# Patient Record
Sex: FEMALE | Race: WHITE | NOT HISPANIC OR LATINO | Employment: OTHER | URBAN - METROPOLITAN AREA
[De-identification: names, ages, dates, MRNs, and addresses within clinical notes are randomized per-mention and may not be internally consistent; named-entity substitution may affect disease eponyms.]

---

## 2018-11-28 ENCOUNTER — OFFICE VISIT (OUTPATIENT)
Dept: FAMILY MEDICINE CLINIC | Facility: CLINIC | Age: 63
End: 2018-11-28
Payer: COMMERCIAL

## 2018-11-28 VITALS
HEART RATE: 74 BPM | HEIGHT: 69 IN | BODY MASS INDEX: 25.62 KG/M2 | RESPIRATION RATE: 16 BRPM | DIASTOLIC BLOOD PRESSURE: 78 MMHG | SYSTOLIC BLOOD PRESSURE: 110 MMHG | OXYGEN SATURATION: 98 % | WEIGHT: 173 LBS

## 2018-11-28 DIAGNOSIS — Z23 NEED FOR TDAP VACCINATION: Primary | ICD-10-CM

## 2018-11-28 DIAGNOSIS — Z13.6 SCREENING FOR CARDIOVASCULAR CONDITION: ICD-10-CM

## 2018-11-28 DIAGNOSIS — Z12.31 ENCOUNTER FOR SCREENING MAMMOGRAM FOR BREAST CANCER: ICD-10-CM

## 2018-11-28 DIAGNOSIS — F41.9 ANXIETY: ICD-10-CM

## 2018-11-28 PROCEDURE — 90715 TDAP VACCINE 7 YRS/> IM: CPT | Performed by: FAMILY MEDICINE

## 2018-11-28 PROCEDURE — 99204 OFFICE O/P NEW MOD 45 MIN: CPT | Performed by: FAMILY MEDICINE

## 2018-11-28 PROCEDURE — 1036F TOBACCO NON-USER: CPT | Performed by: FAMILY MEDICINE

## 2018-11-28 PROCEDURE — 90471 IMMUNIZATION ADMIN: CPT | Performed by: FAMILY MEDICINE

## 2018-11-28 RX ORDER — ALPRAZOLAM 0.25 MG/1
0.25 TABLET ORAL
Qty: 30 TABLET | Refills: 0 | Status: SHIPPED | OUTPATIENT
Start: 2018-11-28 | End: 2019-01-16 | Stop reason: SDUPTHER

## 2018-11-28 RX ORDER — PAROXETINE HYDROCHLORIDE 12.5 MG/1
12.5 TABLET, FILM COATED, EXTENDED RELEASE ORAL EVERY MORNING
Qty: 30 TABLET | Refills: 0 | Status: SHIPPED | OUTPATIENT
Start: 2018-11-28 | End: 2018-12-05 | Stop reason: SDUPTHER

## 2018-11-28 NOTE — ASSESSMENT & PLAN NOTE
checked, she had last prescription 1 year ago for 30 pills of alprazolam,  I gave her a new prescription for alprazolam to be used as needed and she will start on Paxil and advised to come back follow-up in a week, she has been informed about any side effects like suicidal homicidal thoughts she should not continue medication then and she should call the doctor

## 2018-11-28 NOTE — PROGRESS NOTES
Assessment/Plan:    Problem List Items Addressed This Visit        Other    Need for Tdap vaccination - Primary    Relevant Orders    TDAP VACCINE GREATER THAN OR EQUAL TO 8YO IM (Completed)    Anxiety      checked, she had last prescription 1 year ago for 30 pills of alprazolam,  I gave her a new prescription for alprazolam to be used as needed and she will start on Paxil and advised to come back follow-up in a week, she has been informed about any side effects like suicidal homicidal thoughts she should not continue medication then and she should call the doctor         Relevant Medications    PARoxetine (PAXIL-CR) 12 5 mg 24 hr tablet    ALPRAZolam (XANAX) 0 25 mg tablet    Screening for cardiovascular condition    Relevant Orders    CBC and differential    Comprehensive metabolic panel    Lipid panel    TSH, 3rd generation          Chief Complaint   Patient presents with   [facility]    Anxiety       Subjective:   Patient ID: Antonio Barkley is a 66 y o  female  She is a new patient, she says that she has long history of anxiety and panic, she says even when she was in school she used to feel nervousness and anxiousness, she says few years ago when her  passed away she also felt depressed but she says mostly anxiety is the main problem, she also has some problem in sleeping sometimes she takes over-the-counter melatonin,  She says she was given alprazolam a year ago by another physician and she still has 2 tablets left she takes occasionally when she really feels very high anxiety  She does not smoke she drinks alcohol few times per week    She has previous history of hysterectomy and oophorectomy, she is due for her Tdap and mammogram and she has no labs done recently  She says when she had panic attack she was checked completely and she was told she has mitral valve prolapse but she is asymptomatic          Review of Systems   Constitutional: Negative for activity change, appetite change, chills, diaphoresis, fatigue, fever and unexpected weight change  HENT: Negative for congestion, dental problem, ear discharge, ear pain, facial swelling, hearing loss, mouth sores, nosebleeds, postnasal drip, rhinorrhea, sinus pain, sinus pressure, sneezing, sore throat, trouble swallowing and voice change  Eyes: Negative for photophobia, pain, discharge, redness and itching  Respiratory: Negative for cough, chest tightness, shortness of breath and wheezing  Cardiovascular: Negative for chest pain, palpitations and leg swelling  Gastrointestinal: Negative for abdominal distention, abdominal pain, blood in stool, constipation, diarrhea and nausea  Endocrine: Negative for cold intolerance, heat intolerance, polydipsia, polyphagia and polyuria  Genitourinary: Negative for dysuria, flank pain, frequency, hematuria and urgency  Musculoskeletal: Negative for arthralgias, back pain, myalgias and neck pain  Skin: Negative for color change and pallor  Allergic/Immunologic: Negative for environmental allergies and food allergies  Neurological: Negative for dizziness, weakness, light-headedness, numbness and headaches  Hematological: Negative for adenopathy  Does not bruise/bleed easily  Psychiatric/Behavioral: Negative for behavioral problems, sleep disturbance and suicidal ideas  The patient is not nervous/anxious  Objective:  Physical Exam   Constitutional: She is oriented to person, place, and time  She appears well-developed and well-nourished  HENT:   Head: Normocephalic and atraumatic  Right Ear: External ear normal    Left Ear: External ear normal    Nose: Nose normal    Mouth/Throat: Oropharynx is clear and moist  No oropharyngeal exudate  Eyes: Pupils are equal, round, and reactive to light  Conjunctivae and EOM are normal  Right eye exhibits no discharge  Left eye exhibits no discharge  No scleral icterus  Neck: Normal range of motion  Neck supple   No tracheal deviation present  No thyromegaly present  Cardiovascular: Normal rate, regular rhythm and normal heart sounds  No murmur heard  Pulmonary/Chest: Effort normal and breath sounds normal  No respiratory distress  She has no wheezes  She has no rales  Abdominal: Soft  Bowel sounds are normal  She exhibits no distension and no mass  There is no tenderness  There is no rebound  Musculoskeletal: Normal range of motion  She exhibits no edema  Lymphadenopathy:     She has no cervical adenopathy  Neurological: She is alert and oriented to person, place, and time  She has normal reflexes  No cranial nerve deficit  Skin: Skin is warm  No rash noted  No erythema  No pallor  Psychiatric: She has a normal mood and affect  Her behavior is normal  Judgment and thought content normal    Nursing note and vitals reviewed            Past Surgical History:   Procedure Laterality Date    CHOLECYSTECTOMY      HYSTERECTOMY      SINUS SURGERY         Family History   Problem Relation Age of Onset    Atrial fibrillation Mother     Hypertension Mother     No Known Problems Father          Current Outpatient Prescriptions:     ALPRAZolam (XANAX) 0 25 mg tablet, Take 1 tablet (0 25 mg total) by mouth daily at bedtime as needed for anxiety for up to 30 days, Disp: 30 tablet, Rfl: 0    PARoxetine (PAXIL-CR) 12 5 mg 24 hr tablet, Take 1 tablet (12 5 mg total) by mouth every morning, Disp: 30 tablet, Rfl: 0    No Known Allergies    Vitals:    11/28/18 1013   BP: 110/78   Pulse: 74   Resp: 16   SpO2: 98%   Weight: 78 5 kg (173 lb)   Height: 5' 9" (1 753 m)

## 2018-12-05 ENCOUNTER — OFFICE VISIT (OUTPATIENT)
Dept: FAMILY MEDICINE CLINIC | Facility: CLINIC | Age: 63
End: 2018-12-05
Payer: COMMERCIAL

## 2018-12-05 VITALS
BODY MASS INDEX: 25.62 KG/M2 | HEART RATE: 72 BPM | HEIGHT: 69 IN | DIASTOLIC BLOOD PRESSURE: 68 MMHG | WEIGHT: 173 LBS | RESPIRATION RATE: 16 BRPM | OXYGEN SATURATION: 98 % | SYSTOLIC BLOOD PRESSURE: 104 MMHG

## 2018-12-05 DIAGNOSIS — F41.9 ANXIETY: ICD-10-CM

## 2018-12-05 PROCEDURE — 99213 OFFICE O/P EST LOW 20 MIN: CPT | Performed by: FAMILY MEDICINE

## 2018-12-05 PROCEDURE — 3008F BODY MASS INDEX DOCD: CPT | Performed by: FAMILY MEDICINE

## 2018-12-05 RX ORDER — PAROXETINE HYDROCHLORIDE 12.5 MG/1
12.5 TABLET, FILM COATED, EXTENDED RELEASE ORAL EVERY MORNING
Qty: 30 TABLET | Refills: 0 | Status: SHIPPED | OUTPATIENT
Start: 2018-12-05 | End: 2019-01-16 | Stop reason: ALTCHOICE

## 2018-12-05 NOTE — ASSESSMENT & PLAN NOTE
Anxiety somewhat improved with Paxil 12 5 mg, will continue the same medication and will follow up in 6 weeks, she has no side effects with medication she still has some sleep issues she has not use any alprazolam yet    Discussed about sleep hygiene and melatonin as needed

## 2018-12-05 NOTE — PROGRESS NOTES
Assessment/Plan:    Problem List Items Addressed This Visit        Other    Anxiety     Anxiety somewhat improved with Paxil 12 5 mg, will continue the same medication and will follow up in 6 weeks, she has no side effects with medication she still has some sleep issues she has not use any alprazolam yet  Discussed about sleep hygiene and melatonin as needed         Relevant Medications    PARoxetine (PAXIL-CR) 12 5 mg 24 hr tablet          Chief Complaint   Patient presents with    Follow-up       Subjective:   Patient ID: Maile Montalvo is a 66 y o  female  she was started on Paxil last week and she feels like it is helping to improve her anxiousness, she still has problem in sleeping she says some days she sleeps 7 hr ,another days she sometimes cannot sleep and she takes melatonin at bedtime some days  She has a previous long history of anxiety and in the past she has taken many medications  She has no suicidal homicidal thoughts, she does not feel depress her appetite is normal and she avoids caffeinated drinks in the evening time  Review of Systems   Constitutional: Negative for activity change, appetite change, chills, diaphoresis, fatigue, fever and unexpected weight change  HENT: Negative for congestion, dental problem, ear discharge, ear pain, facial swelling, hearing loss, mouth sores, nosebleeds, postnasal drip, rhinorrhea, sinus pain, sinus pressure, sneezing, sore throat, trouble swallowing and voice change  Eyes: Negative for photophobia, pain, discharge, redness and itching  Respiratory: Negative for cough, chest tightness, shortness of breath and wheezing  Cardiovascular: Negative for chest pain, palpitations and leg swelling  Gastrointestinal: Negative for abdominal distention, abdominal pain, blood in stool, constipation, diarrhea and nausea  Endocrine: Negative for cold intolerance, heat intolerance, polydipsia, polyphagia and polyuria     Genitourinary: Negative for dysuria, flank pain, frequency, hematuria and urgency  Musculoskeletal: Negative for arthralgias, back pain, myalgias and neck pain  Skin: Negative for color change and pallor  Allergic/Immunologic: Negative for environmental allergies and food allergies  Neurological: Negative for dizziness, weakness, light-headedness, numbness and headaches  Hematological: Negative for adenopathy  Does not bruise/bleed easily  Psychiatric/Behavioral: Positive for sleep disturbance  Negative for behavioral problems and suicidal ideas  The patient is not nervous/anxious  Objective:  Physical Exam   Constitutional: She appears well-developed  HENT:   Head: Normocephalic and atraumatic  Eyes: EOM are normal    Neck: Neck supple  Cardiovascular: Normal rate and regular rhythm  Pulmonary/Chest: Effort normal and breath sounds normal  No respiratory distress  Nursing note and vitals reviewed           Past Surgical History:   Procedure Laterality Date    CHOLECYSTECTOMY      HYSTERECTOMY      SINUS SURGERY         Family History   Problem Relation Age of Onset    Atrial fibrillation Mother     Hypertension Mother     No Known Problems Father     Diabetes Brother          Current Outpatient Prescriptions:     ALPRAZolam (XANAX) 0 25 mg tablet, Take 1 tablet (0 25 mg total) by mouth daily at bedtime as needed for anxiety for up to 30 days, Disp: 30 tablet, Rfl: 0    PARoxetine (PAXIL-CR) 12 5 mg 24 hr tablet, Take 1 tablet (12 5 mg total) by mouth every morning, Disp: 30 tablet, Rfl: 0    No Known Allergies    Vitals:    12/05/18 0801   BP: 104/68   Pulse: 72   Resp: 16   SpO2: 98%   Weight: 78 5 kg (173 lb)   Height: 5' 9" (1 753 m)

## 2018-12-26 DIAGNOSIS — F41.9 ANXIETY: ICD-10-CM

## 2018-12-27 RX ORDER — PAROXETINE HYDROCHLORIDE 12.5 MG/1
12.5 TABLET, FILM COATED, EXTENDED RELEASE ORAL EVERY MORNING
Qty: 30 TABLET | Refills: 0 | Status: SHIPPED | OUTPATIENT
Start: 2018-12-27 | End: 2019-01-16

## 2018-12-27 NOTE — TELEPHONE ENCOUNTER
Patient last seen 12/5 and instructed to return in 6 weeks    Patient has follow up scheduled for 1/16/19

## 2019-01-16 ENCOUNTER — OFFICE VISIT (OUTPATIENT)
Dept: FAMILY MEDICINE CLINIC | Facility: CLINIC | Age: 64
End: 2019-01-16
Payer: COMMERCIAL

## 2019-01-16 VITALS
SYSTOLIC BLOOD PRESSURE: 102 MMHG | HEART RATE: 90 BPM | DIASTOLIC BLOOD PRESSURE: 70 MMHG | BODY MASS INDEX: 26.07 KG/M2 | OXYGEN SATURATION: 99 % | WEIGHT: 176 LBS | HEIGHT: 69 IN | RESPIRATION RATE: 16 BRPM

## 2019-01-16 DIAGNOSIS — F41.9 ANXIETY: ICD-10-CM

## 2019-01-16 PROCEDURE — 99213 OFFICE O/P EST LOW 20 MIN: CPT | Performed by: FAMILY MEDICINE

## 2019-01-16 RX ORDER — ESCITALOPRAM OXALATE 10 MG/1
10 TABLET ORAL DAILY
Qty: 30 TABLET | Refills: 0 | Status: SHIPPED | OUTPATIENT
Start: 2019-01-16 | End: 2019-01-30 | Stop reason: SDUPTHER

## 2019-01-16 RX ORDER — ALPRAZOLAM 0.25 MG/1
0.25 TABLET ORAL 2 TIMES DAILY PRN
Qty: 30 TABLET | Refills: 0 | Status: SHIPPED | OUTPATIENT
Start: 2019-01-16 | End: 2019-02-15

## 2019-01-16 NOTE — ASSESSMENT & PLAN NOTE
She says initially medicine was working Paxil 12 5 mg but gradually she started feeling that she is getting more anxious and now this week and she had almost panic attacks and she was crying with heart racing and feeling very jittery  , she has been taking Xanax as to relax sometimes as needed and she took 1 pill this morning, advised to stop the Paxil and after 2 days she will start Lexapro and in the meantime continue alprazolam as needed 1-2 tablets, and then follow up about 2 weeks  [facility] site checked about xanax script   I discussed with her that as she stop alcohol in last few days maybe that is increasing her panicking and anxiety, she should not restart alcohol she can only take Xanax as needed and I will start her on Lexapro

## 2019-01-16 NOTE — PROGRESS NOTES
Assessment/Plan:    Problem List Items Addressed This Visit        Other    Anxiety     She says initially medicine was working Paxil 12 5 mg but gradually she started feeling that she is getting more anxious and now this week and she had almost panic attacks and she was crying with heart racing and feeling very jittery  , she has been taking Xanax as to relax sometimes as needed and she took 1 pill this morning, advised to stop the Paxil and after 2 days she will start Lexapro and in the meantime continue alprazolam as needed 1-2 tablets, and then follow up about 2 weeks  [facility] site checked about xanax script   I discussed with her that as she stop alcohol in last few days maybe that is increasing her panicking and anxiety, she should not restart alcohol she can only take Xanax as needed and I will start her on Lexapro         Relevant Medications    ALPRAZolam (XANAX) 0 25 mg tablet    escitalopram (LEXAPRO) 10 mg tablet          Chief Complaint   Patient presents with    Follow-up       Subjective:   Patient ID: Jesika Quintana is a 66 y o  female  She is here for follow-up on anxiety and panic, she was started on Paxil 12 5 mg 6 weeks ago initially it start helping her and she was more come, but now she says gradually she is getting more anxious and on this Weekend since last 3 days she has been very panicking and she has been using Xanax 1-2 tablets and she says there is no reason why she start getting panic  She is not smoker but she admits that she was drinking alcohol 3-4 glasses every day and and now for last few days she has stopped it completely as she does not want to do it  She admits that she never have any alcohol problems  She denies any other drug problem  She works , and today she took off  Review of Systems   Constitutional: Negative for activity change, appetite change, chills, diaphoresis, fatigue, fever and unexpected weight change     HENT: Negative for congestion, dental problem, ear discharge, ear pain, facial swelling, hearing loss, mouth sores, nosebleeds, postnasal drip, rhinorrhea, sinus pain, sinus pressure, sneezing, sore throat, trouble swallowing and voice change  Eyes: Negative for photophobia, pain, discharge, redness and itching  Respiratory: Negative for cough, chest tightness, shortness of breath and wheezing  Cardiovascular: Negative for chest pain, palpitations and leg swelling  Gastrointestinal: Negative for abdominal distention, abdominal pain, blood in stool, constipation, diarrhea and nausea  Endocrine: Negative for cold intolerance, heat intolerance, polydipsia, polyphagia and polyuria  Genitourinary: Negative for dysuria, flank pain, frequency, hematuria and urgency  Musculoskeletal: Negative for arthralgias, back pain, myalgias and neck pain  Skin: Negative for color change and pallor  Allergic/Immunologic: Negative for environmental allergies and food allergies  Neurological: Negative for dizziness, weakness, light-headedness, numbness and headaches  Hematological: Negative for adenopathy  Does not bruise/bleed easily  Psychiatric/Behavioral: Negative for behavioral problems, sleep disturbance and suicidal ideas  The patient is nervous/anxious  Objective:  Physical Exam   Constitutional: She appears well-developed and well-nourished  HENT:   Head: Normocephalic and atraumatic  Eyes: Pupils are equal, round, and reactive to light  Conjunctivae and EOM are normal  No scleral icterus  Neck: Normal range of motion  Neck supple  No thyromegaly present  Cardiovascular: Normal rate, regular rhythm and normal heart sounds  Pulmonary/Chest: Effort normal and breath sounds normal  She has no wheezes  She has no rales  Lymphadenopathy:     She has no cervical adenopathy  Neurological: She is alert  Skin: No rash noted  No erythema  Psychiatric:   Looking very anxious and tearful   Nursing note and vitals reviewed           Past Surgical History:   Procedure Laterality Date    CHOLECYSTECTOMY      HYSTERECTOMY      SINUS SURGERY         Family History   Problem Relation Age of Onset    Atrial fibrillation Mother     Hypertension Mother     No Known Problems Father     Diabetes Brother          Current Outpatient Prescriptions:     ALPRAZolam (XANAX) 0 25 mg tablet, Take 1 tablet (0 25 mg total) by mouth 2 (two) times a day as needed for anxiety for up to 30 days, Disp: 30 tablet, Rfl: 0    escitalopram (LEXAPRO) 10 mg tablet, Take 1 tablet (10 mg total) by mouth daily, Disp: 30 tablet, Rfl: 0    No Known Allergies    Vitals:    01/16/19 0757 01/16/19 0826   BP: 102/70    Pulse: (!) 112 90   Resp: 16    SpO2: 99%    Weight: 79 8 kg (176 lb)    Height: 5' 9" (1 753 m)

## 2019-01-30 ENCOUNTER — OFFICE VISIT (OUTPATIENT)
Dept: FAMILY MEDICINE CLINIC | Facility: CLINIC | Age: 64
End: 2019-01-30
Payer: COMMERCIAL

## 2019-01-30 VITALS
WEIGHT: 177 LBS | OXYGEN SATURATION: 98 % | DIASTOLIC BLOOD PRESSURE: 78 MMHG | BODY MASS INDEX: 26.22 KG/M2 | HEIGHT: 69 IN | SYSTOLIC BLOOD PRESSURE: 112 MMHG | HEART RATE: 98 BPM | RESPIRATION RATE: 16 BRPM

## 2019-01-30 DIAGNOSIS — F41.9 ANXIETY: ICD-10-CM

## 2019-01-30 PROCEDURE — 1036F TOBACCO NON-USER: CPT | Performed by: FAMILY MEDICINE

## 2019-01-30 PROCEDURE — 3008F BODY MASS INDEX DOCD: CPT | Performed by: FAMILY MEDICINE

## 2019-01-30 PROCEDURE — 99213 OFFICE O/P EST LOW 20 MIN: CPT | Performed by: FAMILY MEDICINE

## 2019-01-30 RX ORDER — ESCITALOPRAM OXALATE 10 MG/1
10 TABLET ORAL DAILY
Qty: 30 TABLET | Refills: 0 | Status: SHIPPED | OUTPATIENT
Start: 2019-01-30 | End: 2022-05-05

## 2019-01-30 NOTE — PROGRESS NOTES
Assessment/Plan:    Problem List Items Addressed This Visit        Other    Anxiety     Anxiety improved with the Lexapro, Paxil was stopped because that increase her anxiety  She did not use Xanax in last 2 weeks  Continue Lexapro and discussed about healthy lifestyle cutting down her alcohol for the and no caffeinated drinks  And she should plan doing regular exercise  Follow-up in 3 weeks         Relevant Medications    escitalopram (LEXAPRO) 10 mg tablet          Chief Complaint   Patient presents with    Follow-up    Anxiety       Subjective:   Patient ID: Maile Montalvo is a 66 y o  female  She is here for follow-up on anxiety, she was started on Lexapro about 2 weeks ago after stopping Paxil, she says as soon as she stops Paxil she felt much better and she thinks her anxiety due got worse with the Paxil,  She says for last 2 weeks he has not even use 1 time Xanax , she picked up her Xanax but she does not think she needs it,  She has a family history of anxiety,  She also admitted that she has cut down on her alcohol drinking and in last 2 weeks she only date 3 times  She tries to avoid caffeinated drinks  She says she has mild headache for last few days but she thinks her sinuses probably giving her some headache but she has no nasal discharge no sore throat fever or chills        Review of Systems   Constitutional: Negative for activity change, appetite change, chills, diaphoresis, fatigue, fever and unexpected weight change  HENT: Negative for congestion, dental problem, ear discharge, ear pain, facial swelling, hearing loss, mouth sores, nosebleeds, postnasal drip, rhinorrhea, sinus pain, sinus pressure, sneezing, sore throat, trouble swallowing and voice change  Eyes: Negative for photophobia, pain, discharge, redness and itching  Respiratory: Negative for cough, chest tightness, shortness of breath and wheezing  Cardiovascular: Negative for chest pain, palpitations and leg swelling  Gastrointestinal: Negative for abdominal distention, abdominal pain, blood in stool, constipation, diarrhea and nausea  Endocrine: Negative for cold intolerance, heat intolerance, polydipsia, polyphagia and polyuria  Genitourinary: Negative for dysuria, flank pain, frequency, hematuria and urgency  Musculoskeletal: Negative for arthralgias, back pain, myalgias and neck pain  Skin: Negative for color change and pallor  Allergic/Immunologic: Negative for environmental allergies and food allergies  Neurological: Negative for dizziness, weakness, light-headedness, numbness and headaches  Hematological: Negative for adenopathy  Does not bruise/bleed easily  Psychiatric/Behavioral: Negative for behavioral problems, sleep disturbance and suicidal ideas  The patient is not nervous/anxious  Objective:  Physical Exam   Constitutional: She appears well-developed and well-nourished  HENT:   Head: Normocephalic and atraumatic  Right Ear: External ear normal    Left Ear: External ear normal    Mouth/Throat: Oropharynx is clear and moist    Eyes: Pupils are equal, round, and reactive to light  Conjunctivae and EOM are normal  No scleral icterus  Neck: Normal range of motion  Neck supple  No thyromegaly present  Cardiovascular: Normal rate, regular rhythm and normal heart sounds  Pulmonary/Chest: Effort normal and breath sounds normal  She has no wheezes  She has no rales  Lymphadenopathy:     She has no cervical adenopathy  Neurological: She is alert  Skin: No rash noted  No erythema  Psychiatric: She has a normal mood and affect  Her behavior is normal  Judgment and thought content normal    Nursing note and vitals reviewed           Past Surgical History:   Procedure Laterality Date    CHOLECYSTECTOMY      HYSTERECTOMY      SINUS SURGERY         Family History   Problem Relation Age of Onset    Atrial fibrillation Mother     Hypertension Mother     No Known Problems Father    Declan Diabetes Brother          Current Outpatient Prescriptions:     ALPRAZolam (XANAX) 0 25 mg tablet, Take 1 tablet (0 25 mg total) by mouth 2 (two) times a day as needed for anxiety for up to 30 days, Disp: 30 tablet, Rfl: 0    escitalopram (LEXAPRO) 10 mg tablet, Take 1 tablet (10 mg total) by mouth daily, Disp: 30 tablet, Rfl: 0    No Known Allergies    Vitals:    01/30/19 0815 01/30/19 0826   BP: 112/78    Pulse: 100 98   Resp: 16    SpO2: 98%    Weight: 80 3 kg (177 lb)    Height: 5' 9" (1 753 m)

## 2019-01-30 NOTE — ASSESSMENT & PLAN NOTE
Anxiety improved with the Lexapro, Paxil was stopped because that increase her anxiety  She did not use Xanax in last 2 weeks  Continue Lexapro and discussed about healthy lifestyle cutting down her alcohol for the and no caffeinated drinks  And she should plan doing regular exercise    Follow-up in 3 weeks

## 2019-08-08 DIAGNOSIS — Z12.31 ENCOUNTER FOR SCREENING MAMMOGRAM FOR BREAST CANCER: Primary | ICD-10-CM

## 2020-02-14 ENCOUNTER — OFFICE VISIT (OUTPATIENT)
Dept: GASTROENTEROLOGY | Facility: AMBULARY SURGERY CENTER | Age: 65
End: 2020-02-14
Payer: COMMERCIAL

## 2020-02-14 VITALS
TEMPERATURE: 98.1 F | WEIGHT: 164 LBS | BODY MASS INDEX: 24.29 KG/M2 | DIASTOLIC BLOOD PRESSURE: 80 MMHG | SYSTOLIC BLOOD PRESSURE: 115 MMHG | HEART RATE: 80 BPM | HEIGHT: 69 IN

## 2020-02-14 DIAGNOSIS — R10.31 CHRONIC RLQ PAIN: ICD-10-CM

## 2020-02-14 DIAGNOSIS — R19.8 CHANGE IN BOWEL MOVEMENT: Primary | ICD-10-CM

## 2020-02-14 DIAGNOSIS — G89.29 CHRONIC RLQ PAIN: ICD-10-CM

## 2020-02-14 PROCEDURE — 3008F BODY MASS INDEX DOCD: CPT | Performed by: INTERNAL MEDICINE

## 2020-02-14 PROCEDURE — 99244 OFF/OP CNSLTJ NEW/EST MOD 40: CPT | Performed by: INTERNAL MEDICINE

## 2020-02-14 RX ORDER — VILAZODONE HYDROCHLORIDE 10 MG/1
TABLET ORAL
COMMUNITY
Start: 2020-01-07 | End: 2022-05-05

## 2020-02-14 RX ORDER — ALPRAZOLAM 0.25 MG/1
TABLET ORAL
COMMUNITY
Start: 2020-02-11 | End: 2022-06-20 | Stop reason: SDUPTHER

## 2020-02-14 NOTE — LETTER
February 14, 2020     Kingston Alfaro MD  [address]  [place] [id number]    Patient: Nidia Mckay   YOB: 1955   Date of Visit: 2/14/2020       Dear Dr Collette Mark:    Thank you for referring [place] to me for evaluation  Below are my notes for this consultation  If you have questions, please do not hesitate to call me  I look forward to following your patient along with you  Sincerely,        Marvin Rivero MD        CC: No Recipients  Marvin Rivero MD  2/14/2020  8:34 AM  Sign at close encounter  Consultation - [facility]  Nidia Mckay 60 y o  female MRN: [id number]          Assessment & Plan:    Pleasant 68-year-old female with a history of depression, anxiety, longstanding history of irregular stools, more recently associated with right lower quadrant discomfort and worsening irregularity of bowel movements with increasing loose stools  1  Change in bowel movements with right lower quadrant discomfort:  Differential is most likely secondary to irritable bowel syndrome, inflammatory, malignancies are all on the differential  -we will check celiac panel  -we will check a general abdominal ultrasound  -recommended trial of lactose-free diet and fiber supplementation on a regular basis  -we will proceed with colonoscopy to evaluate for the above luminal process is  -discussed with her the risks of the procedure including bleeding, surgery, perforation  -we will try to obtain her recent laboratory studies from outside lab as well to review  -if indicated we may add an upper endoscopy to her colonoscopy            _____________________________________________________________        CC:  Intermittent right lower quadrant discomfort and change in bowel movements    HPI:  Nidia Mckay is a 60 y  o female who was referred for evaluation of right lower quadrant discomfort and change in bowel movements    As you know this is a pleasant 68-year-old female with a history depression and anxiety, she had a normal colonoscopy in 2014, no history of GI or associated malignancies or inflammatory bowel disease reports a longstanding history of intermittent diarrhea typically associated with stressors  More recently over the past several years she has had a dull right lower quadrant pain which seems to improve with bowel movements  The pain is intermittent at times, not necessarily diet related but does seem to improve as noted above with bowel movements which she has alternating diarrhea and constipation, predominately tend towards diarrheal type symptoms  She denies any melena or rectal bleeding  Denies any nausea, vomiting, heartburn, dysphagia  Her weight has been stable, she has intentionally lost approximately 15 lb  Patient has tried a gluten free diet which has not significantly helped  Surgical history is notable for hysterectomy, cholecystectomy many years ago  She denies any tobacco, drinks about 9 drinks per week  She works in customer service  Family history is noted above is unremarkable  ROS:  The remainder of the ROS was negative except for the pertinent positives mentioned in HPI  Allergies: Patient has no known allergies      Medications:   Current Outpatient Medications:     ALPRAZolam (XANAX) 0 25 mg tablet, , Disp: , Rfl:     VIIBRYD 10 MG tablet, , Disp: , Rfl:     ALPRAZolam (XANAX) 0 25 mg tablet, Take 1 tablet (0 25 mg total) by mouth 2 (two) times a day as needed for anxiety for up to 30 days, Disp: 30 tablet, Rfl: 0    escitalopram (LEXAPRO) 10 mg tablet, Take 1 tablet (10 mg total) by mouth daily (Patient not taking: Reported on 2/14/2020), Disp: 30 tablet, Rfl: 0    Na Sulfate-K Sulfate-Mg Sulf (Suprep Bowel Prep Kit) 17 5-3 13-1 6 GM/177ML SOLN, Take 1 Bottle by mouth once for 1 dose, Disp: 1 Bottle, Rfl: 0'    Past Medical History:   Diagnosis Date    Anxiety     Mitral prolapse        Past Surgical History:   Procedure Laterality Date    CHOLECYSTECTOMY      HYSTERECTOMY      SINUS SURGERY         Family History   Problem Relation Age of Onset    Atrial fibrillation Mother     Hypertension Mother     No Known Problems Father     Diabetes Brother     Anxiety disorder Maternal Grandmother         reports that she has never smoked  She has never used smokeless tobacco  She reports that she drinks alcohol  She reports that she does not use drugs            Physical Exam:     /80   Pulse 80   Temp 98 1 °F (36 7 °C) (Tympanic)   Ht 5' 9" (1 753 m)   Wt 74 4 kg (164 lb)   BMI 24 22 kg/m²      Gen: wn/wd, NAD, healthy-appearing female  HEENT: anicteric, MMM, no cervical LAD  CVS: RRR, no m/r/g  CHEST: CTA b/l  ABD: +BS, soft, minimal right lower quadrant discomfort with deep palpation, no hepatosplenomegaly  EXT: no c/c/e  NEURO: aaox3  SKIN: NO rashes

## 2020-02-14 NOTE — PROGRESS NOTES
Consultation - [facility]  Tom Durand 60 y o  female MRN: [id number]          Assessment & Plan:    Pleasant 60-year-old female with a history of depression, anxiety, longstanding history of irregular stools, more recently associated with right lower quadrant discomfort and worsening irregularity of bowel movements with increasing loose stools  1  Change in bowel movements with right lower quadrant discomfort:  Differential is most likely secondary to irritable bowel syndrome, inflammatory, malignancies are all on the differential  -we will check celiac panel  -we will check a general abdominal ultrasound  -recommended trial of lactose-free diet and fiber supplementation on a regular basis  -we will proceed with colonoscopy to evaluate for the above luminal process is  -discussed with her the risks of the procedure including bleeding, surgery, perforation  -we will try to obtain her recent laboratory studies from outside lab as well to review  -if indicated we may add an upper endoscopy to her colonoscopy            _____________________________________________________________        CC:  Intermittent right lower quadrant discomfort and change in bowel movements    HPI:  Tom Durand is a 60 y  o female who was referred for evaluation of right lower quadrant discomfort and change in bowel movements  As you know this is a pleasant 60-year-old female with a history depression and anxiety, she had a normal colonoscopy in 2014, no history of GI or associated malignancies or inflammatory bowel disease reports a longstanding history of intermittent diarrhea typically associated with stressors  More recently over the past several years she has had a dull right lower quadrant pain which seems to improve with bowel movements    The pain is intermittent at times, not necessarily diet related but does seem to improve as noted above with bowel movements which she has alternating diarrhea and constipation, predominately tend towards diarrheal type symptoms  She denies any melena or rectal bleeding  Denies any nausea, vomiting, heartburn, dysphagia  Her weight has been stable, she has intentionally lost approximately 15 lb  Patient has tried a gluten free diet which has not significantly helped  Surgical history is notable for hysterectomy, cholecystectomy many years ago  She denies any tobacco, drinks about 9 drinks per week  She works in customer service  Family history is noted above is unremarkable  ROS:  The remainder of the ROS was negative except for the pertinent positives mentioned in HPI  Allergies: Patient has no known allergies  Medications:   Current Outpatient Medications:     ALPRAZolam (XANAX) 0 25 mg tablet, , Disp: , Rfl:     VIIBRYD 10 MG tablet, , Disp: , Rfl:     ALPRAZolam (XANAX) 0 25 mg tablet, Take 1 tablet (0 25 mg total) by mouth 2 (two) times a day as needed for anxiety for up to 30 days, Disp: 30 tablet, Rfl: 0    escitalopram (LEXAPRO) 10 mg tablet, Take 1 tablet (10 mg total) by mouth daily (Patient not taking: Reported on 2/14/2020), Disp: 30 tablet, Rfl: 0    Na Sulfate-K Sulfate-Mg Sulf (Suprep Bowel Prep Kit) 17 5-3 13-1 6 GM/177ML SOLN, Take 1 Bottle by mouth once for 1 dose, Disp: 1 Bottle, Rfl: 0'    Past Medical History:   Diagnosis Date    Anxiety     Mitral prolapse        Past Surgical History:   Procedure Laterality Date    CHOLECYSTECTOMY      HYSTERECTOMY      SINUS SURGERY         Family History   Problem Relation Age of Onset    Atrial fibrillation Mother     Hypertension Mother     No Known Problems Father     Diabetes Brother     Anxiety disorder Maternal Grandmother         reports that she has never smoked  She has never used smokeless tobacco  She reports that she drinks alcohol  She reports that she does not use drugs            Physical Exam:     /80   Pulse 80   Temp 98 1 °F (36 7 °C) (Tympanic) Ht 5' 9" (1 753 m)   Wt 74 4 kg (164 lb)   BMI 24 22 kg/m²     Gen: wn/wd, NAD, healthy-appearing female  HEENT: anicteric, MMM, no cervical LAD  CVS: RRR, no m/r/g  CHEST: CTA b/l  ABD: +BS, soft, minimal right lower quadrant discomfort with deep palpation, no hepatosplenomegaly  EXT: no c/c/e  NEURO: aaox3  SKIN: NO rashes

## 2020-02-24 ENCOUNTER — HOSPITAL ENCOUNTER (OUTPATIENT)
Dept: ULTRASOUND IMAGING | Facility: HOSPITAL | Age: 65
Discharge: HOME | End: 2020-02-24
Attending: INTERNAL MEDICINE
Payer: COMMERCIAL

## 2020-02-24 DIAGNOSIS — G89.29 CHRONIC RLQ PAIN: ICD-10-CM

## 2020-02-24 DIAGNOSIS — R10.31 CHRONIC RLQ PAIN: ICD-10-CM

## 2020-02-24 PROCEDURE — 76700 US EXAM ABDOM COMPLETE: CPT

## 2020-03-10 ENCOUNTER — ANESTHESIA EVENT (OUTPATIENT)
Dept: ANESTHESIOLOGY | Facility: HOSPITAL | Age: 65
End: 2020-03-10

## 2020-03-10 ENCOUNTER — ANESTHESIA (OUTPATIENT)
Dept: ANESTHESIOLOGY | Facility: HOSPITAL | Age: 65
End: 2020-03-10

## 2020-03-20 ENCOUNTER — TELEPHONE (OUTPATIENT)
Dept: GASTROENTEROLOGY | Facility: AMBULARY SURGERY CENTER | Age: 65
End: 2020-03-20

## 2020-03-20 NOTE — TELEPHONE ENCOUNTER
Pt called to reschedule her colonoscopy from 3/24/2020 to 5/20/2020 w/ dr Mary Kate Jasmine at [facility] due to [facility]

## 2020-05-06 ENCOUNTER — TELEPHONE (OUTPATIENT)
Dept: GASTROENTEROLOGY | Facility: CLINIC | Age: 65
End: 2020-05-06

## 2021-04-13 ENCOUNTER — IMMUNIZATIONS (OUTPATIENT)
Dept: FAMILY MEDICINE CLINIC | Facility: HOSPITAL | Age: 66
End: 2021-04-13

## 2021-04-13 DIAGNOSIS — Z23 ENCOUNTER FOR IMMUNIZATION: Primary | ICD-10-CM

## 2021-04-13 PROCEDURE — 91300 SARS-COV-2 / COVID-19 MRNA VACCINE (PFIZER-BIONTECH) 30 MCG: CPT

## 2021-04-13 PROCEDURE — 0001A SARS-COV-2 / COVID-19 MRNA VACCINE (PFIZER-BIONTECH) 30 MCG: CPT

## 2021-05-04 ENCOUNTER — IMMUNIZATIONS (OUTPATIENT)
Dept: FAMILY MEDICINE CLINIC | Facility: HOSPITAL | Age: 66
End: 2021-05-04

## 2021-05-04 DIAGNOSIS — Z23 ENCOUNTER FOR IMMUNIZATION: Primary | ICD-10-CM

## 2021-05-04 PROCEDURE — 91300 SARS-COV-2 / COVID-19 MRNA VACCINE (PFIZER-BIONTECH) 30 MCG: CPT

## 2021-05-04 PROCEDURE — 0002A SARS-COV-2 / COVID-19 MRNA VACCINE (PFIZER-BIONTECH) 30 MCG: CPT

## 2022-05-05 ENCOUNTER — OFFICE VISIT (OUTPATIENT)
Dept: FAMILY MEDICINE CLINIC | Facility: CLINIC | Age: 67
End: 2022-05-05
Payer: MEDICARE

## 2022-05-05 VITALS
SYSTOLIC BLOOD PRESSURE: 122 MMHG | WEIGHT: 165 LBS | OXYGEN SATURATION: 98 % | HEART RATE: 71 BPM | DIASTOLIC BLOOD PRESSURE: 82 MMHG | TEMPERATURE: 97 F | BODY MASS INDEX: 24.44 KG/M2 | HEIGHT: 69 IN

## 2022-05-05 DIAGNOSIS — F41.9 ANXIETY DISORDER, UNSPECIFIED TYPE: ICD-10-CM

## 2022-05-05 DIAGNOSIS — I34.1 MVP (MITRAL VALVE PROLAPSE): ICD-10-CM

## 2022-05-05 DIAGNOSIS — I83.93 VARICOSE VEINS OF BOTH LOWER EXTREMITIES, UNSPECIFIED WHETHER COMPLICATED: ICD-10-CM

## 2022-05-05 DIAGNOSIS — Z12.31 VISIT FOR SCREENING MAMMOGRAM: ICD-10-CM

## 2022-05-05 DIAGNOSIS — Z90.710 HISTORY OF HYSTERECTOMY FOR BENIGN DISEASE: ICD-10-CM

## 2022-05-05 DIAGNOSIS — M67.431 GANGLION CYST OF DORSUM OF RIGHT WRIST: ICD-10-CM

## 2022-05-05 DIAGNOSIS — E51.9 THIAMINE DEFICIENCY: ICD-10-CM

## 2022-05-05 DIAGNOSIS — R79.9 ABNORMAL FINDING OF BLOOD CHEMISTRY, UNSPECIFIED: ICD-10-CM

## 2022-05-05 DIAGNOSIS — Z78.0 POST-MENOPAUSAL: ICD-10-CM

## 2022-05-05 DIAGNOSIS — E53.8 CYANOCOBALAMIN DEFICIENCY: ICD-10-CM

## 2022-05-05 DIAGNOSIS — F43.10 PTSD (POST-TRAUMATIC STRESS DISORDER): ICD-10-CM

## 2022-05-05 DIAGNOSIS — E53.1 PYRIDOXINE DEFICIENCY: ICD-10-CM

## 2022-05-05 DIAGNOSIS — F41.9 ANXIETY: Primary | ICD-10-CM

## 2022-05-05 DIAGNOSIS — E55.9 VITAMIN D DEFICIENCY: ICD-10-CM

## 2022-05-05 PROCEDURE — 99204 OFFICE O/P NEW MOD 45 MIN: CPT | Performed by: FAMILY MEDICINE

## 2022-05-05 RX ORDER — VILAZODONE HYDROCHLORIDE 20 MG/1
TABLET ORAL
COMMUNITY
End: 2022-06-20 | Stop reason: ALTCHOICE

## 2022-05-05 RX ORDER — MUPIROCIN CALCIUM 20 MG/G
CREAM TOPICAL
COMMUNITY
End: 2022-05-05

## 2022-05-05 RX ORDER — AMOXICILLIN 500 MG/1
CAPSULE ORAL
COMMUNITY

## 2022-05-05 RX ORDER — PROPRANOLOL HYDROCHLORIDE 10 MG/1
10 TABLET ORAL
Qty: 30 TABLET | Refills: 1 | Status: SHIPPED | OUTPATIENT
Start: 2022-05-05 | End: 2022-06-20 | Stop reason: SDUPTHER

## 2022-05-05 RX ORDER — BUSPIRONE HYDROCHLORIDE 5 MG/1
5 TABLET ORAL 2 TIMES DAILY
Qty: 60 TABLET | Refills: 1 | Status: SHIPPED | OUTPATIENT
Start: 2022-05-05 | End: 2022-06-20 | Stop reason: SDUPTHER
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
## 2022-06-15 ENCOUNTER — APPOINTMENT (OUTPATIENT)
Dept: LAB | Facility: HOSPITAL | Age: 67
End: 2022-06-15
Attending: FAMILY MEDICINE
Payer: MEDICARE

## 2022-06-15 ENCOUNTER — HOSPITAL ENCOUNTER (OUTPATIENT)
Dept: NON INVASIVE DIAGNOSTICS | Facility: HOSPITAL | Age: 67
Discharge: HOME | End: 2022-06-15
Attending: FAMILY MEDICINE
Payer: MEDICARE

## 2022-06-15 VITALS
HEART RATE: 71 BPM | SYSTOLIC BLOOD PRESSURE: 122 MMHG | BODY MASS INDEX: 24.44 KG/M2 | WEIGHT: 165 LBS | HEIGHT: 69 IN | DIASTOLIC BLOOD PRESSURE: 82 MMHG

## 2022-06-15 DIAGNOSIS — E53.1 PYRIDOXINE DEFICIENCY: ICD-10-CM

## 2022-06-15 DIAGNOSIS — I34.1 MVP (MITRAL VALVE PROLAPSE): ICD-10-CM

## 2022-06-15 DIAGNOSIS — F41.9 ANXIETY DISORDER, UNSPECIFIED TYPE: ICD-10-CM

## 2022-06-15 DIAGNOSIS — F41.9 ANXIETY: ICD-10-CM

## 2022-06-15 DIAGNOSIS — I83.93 VARICOSE VEINS OF BOTH LOWER EXTREMITIES, UNSPECIFIED WHETHER COMPLICATED: ICD-10-CM

## 2022-06-15 DIAGNOSIS — R79.9 ABNORMAL FINDING OF BLOOD CHEMISTRY, UNSPECIFIED: ICD-10-CM

## 2022-06-15 DIAGNOSIS — E55.9 VITAMIN D DEFICIENCY: ICD-10-CM

## 2022-06-15 DIAGNOSIS — E53.8 CYANOCOBALAMIN DEFICIENCY: ICD-10-CM

## 2022-06-15 DIAGNOSIS — E51.9 THIAMINE DEFICIENCY: ICD-10-CM

## 2022-06-15 LAB
25(OH)D3 SERPL-MCNC: 38.6 NG/ML (ref 30–100)
ALBUMIN SERPL BCP-MCNC: 4.3 G/DL (ref 3.5–5)
ALP SERPL-CCNC: 62 U/L (ref 34–104)
ALT SERPL W P-5'-P-CCNC: 12 U/L (ref 7–52)
ANION GAP SERPL CALCULATED.3IONS-SCNC: 8 MMOL/L (ref 4–13)
AORTIC ROOT: 3.4 CM
APICAL FOUR CHAMBER EJECTION FRACTION: 74 %
AST SERPL W P-5'-P-CCNC: 16 U/L (ref 13–39)
BASOPHILS # BLD AUTO: 0.03 THOUSANDS/ΜL (ref 0–0.1)
BASOPHILS NFR BLD AUTO: 1 % (ref 0–1)
BILIRUB SERPL-MCNC: 0.46 MG/DL (ref 0.2–1)
BILIRUB UR QL STRIP: NEGATIVE
BUN SERPL-MCNC: 12 MG/DL (ref 5–25)
CALCIUM SERPL-MCNC: 9.5 MG/DL (ref 8.4–10.2)
CHLORIDE SERPL-SCNC: 101 MMOL/L (ref 96–108)
CHOLEST SERPL-MCNC: 201 MG/DL
CLARITY UR: CLEAR
CO2 SERPL-SCNC: 28 MMOL/L (ref 21–32)
COLOR UR: YELLOW
CREAT SERPL-MCNC: 0.7 MG/DL (ref 0.6–1.3)
E WAVE DECELERATION TIME: 128 MS
EOSINOPHIL # BLD AUTO: 0.15 THOUSAND/ΜL (ref 0–0.61)
EOSINOPHIL NFR BLD AUTO: 3 % (ref 0–6)
ERYTHROCYTE [DISTWIDTH] IN BLOOD BY AUTOMATED COUNT: 12.7 % (ref 11.6–15.1)
FERRITIN SERPL-MCNC: 74 NG/ML (ref 8–388)
FRACTIONAL SHORTENING: 30 (ref 28–44)
GFR SERPL CREATININE-BSD FRML MDRD: 91 ML/MIN/1.73SQ M
GLUCOSE P FAST SERPL-MCNC: 104 MG/DL (ref 65–99)
GLUCOSE UR STRIP-MCNC: NEGATIVE MG/DL
HCT VFR BLD AUTO: 43.8 % (ref 34.8–46.1)
HDLC SERPL-MCNC: 51 MG/DL
HGB BLD-MCNC: 14.5 G/DL (ref 11.5–15.4)
HGB UR QL STRIP.AUTO: NEGATIVE
IMM GRANULOCYTES # BLD AUTO: 0.02 THOUSAND/UL (ref 0–0.2)
IMM GRANULOCYTES NFR BLD AUTO: 0 % (ref 0–2)
INTERVENTRICULAR SEPTUM IN DIASTOLE (PARASTERNAL SHORT AXIS VIEW): 1.2 CM
INTERVENTRICULAR SEPTUM: 1.2 CM (ref 0.6–1.1)
IRON SATN MFR SERPL: 20 % (ref 15–50)
IRON SERPL-MCNC: 70 UG/DL (ref 50–170)
KETONES UR STRIP-MCNC: NEGATIVE MG/DL
LAAS-AP4: 13.5 CM2
LDLC SERPL CALC-MCNC: 124 MG/DL (ref 0–100)
LEFT ATRIUM SIZE: 4 CM
LEFT INTERNAL DIMENSION IN SYSTOLE: 3.5 CM (ref 2.1–4)
LEFT VENTRICULAR INTERNAL DIMENSION IN DIASTOLE: 5 CM (ref 3.5–6)
LEFT VENTRICULAR POSTERIOR WALL IN END DIASTOLE: 0.8 CM
LEFT VENTRICULAR STROKE VOLUME: 66 ML
LEUKOCYTE ESTERASE UR QL STRIP: NEGATIVE
LVSV (TEICH): 66 ML
LYMPHOCYTES # BLD AUTO: 1.57 THOUSANDS/ΜL (ref 0.6–4.47)
LYMPHOCYTES NFR BLD AUTO: 29 % (ref 14–44)
MCH RBC QN AUTO: 30.1 PG (ref 26.8–34.3)
MCHC RBC AUTO-ENTMCNC: 33.1 G/DL (ref 31.4–37.4)
MCV RBC AUTO: 91 FL (ref 82–98)
MONOCYTES # BLD AUTO: 0.52 THOUSAND/ΜL (ref 0.17–1.22)
MONOCYTES NFR BLD AUTO: 10 % (ref 4–12)
MV E'TISSUE VEL-LAT: 8 CM/S
MV PEAK A VEL: 0.72 M/S
MV PEAK E VEL: 54 CM/S
MV STENOSIS PRESSURE HALF TIME: 37 MS
MV VALVE AREA P 1/2 METHOD: 5.95
NEUTROPHILS # BLD AUTO: 3.14 THOUSANDS/ΜL (ref 1.85–7.62)
NEUTS SEG NFR BLD AUTO: 57 % (ref 43–75)
NITRITE UR QL STRIP: NEGATIVE
NRBC BLD AUTO-RTO: 0 /100 WBCS
PH UR STRIP.AUTO: 7.5 [PH]
PLATELET # BLD AUTO: 285 THOUSANDS/UL (ref 149–390)
PMV BLD AUTO: 10.2 FL (ref 8.9–12.7)
POTASSIUM SERPL-SCNC: 4.1 MMOL/L (ref 3.5–5.3)
PROT SERPL-MCNC: 7.3 G/DL (ref 6.4–8.4)
PROT UR STRIP-MCNC: NEGATIVE MG/DL
RA PRESSURE ESTIMATED: 4 MMHG
RBC # BLD AUTO: 4.82 MILLION/UL (ref 3.81–5.12)
RIGHT ATRIUM AREA SYSTOLE A4C: 11.6 CM2
RIGHT VENTRICLE ID DIMENSION: 3 CM
RV PSP: 22 MMHG
SL CV LV EF: 55
SL CV PED ECHO LEFT VENTRICLE DIASTOLIC VOLUME (MOD BIPLANE) 2D: 117 ML
SL CV PED ECHO LEFT VENTRICLE SYSTOLIC VOLUME (MOD BIPLANE) 2D: 50 ML
SODIUM SERPL-SCNC: 137 MMOL/L (ref 135–147)
SP GR UR STRIP.AUTO: 1.01 (ref 1–1.03)
TIBC SERPL-MCNC: 343 UG/DL (ref 250–450)
TR MAX PG: 18 MMHG
TR PEAK VELOCITY: 2.1 M/S
TRICUSPID VALVE PEAK REGURGITATION VELOCITY: 2.1 M/S
TRIGL SERPL-MCNC: 128 MG/DL
TSH SERPL DL<=0.05 MIU/L-ACNC: 2.19 UIU/ML (ref 0.45–4.5)
URATE SERPL-MCNC: 4.2 MG/DL (ref 2–7.5)
UROBILINOGEN UR QL STRIP.AUTO: 0.2 E.U./DL
VIT B12 SERPL-MCNC: 701 PG/ML (ref 100–900)
WBC # BLD AUTO: 5.43 THOUSAND/UL (ref 4.31–10.16)

## 2022-06-15 PROCEDURE — 84207 ASSAY OF VITAMIN B-6: CPT

## 2022-06-15 PROCEDURE — 82607 VITAMIN B-12: CPT

## 2022-06-15 PROCEDURE — 93306 TTE W/DOPPLER COMPLETE: CPT

## 2022-06-15 PROCEDURE — 83550 IRON BINDING TEST: CPT

## 2022-06-15 PROCEDURE — 80053 COMPREHEN METABOLIC PANEL: CPT

## 2022-06-15 PROCEDURE — 36415 COLL VENOUS BLD VENIPUNCTURE: CPT

## 2022-06-15 PROCEDURE — 80061 LIPID PANEL: CPT

## 2022-06-15 PROCEDURE — 82306 VITAMIN D 25 HYDROXY: CPT

## 2022-06-15 PROCEDURE — 84550 ASSAY OF BLOOD/URIC ACID: CPT

## 2022-06-15 PROCEDURE — 84425 ASSAY OF VITAMIN B-1: CPT

## 2022-06-15 PROCEDURE — 85025 COMPLETE CBC W/AUTO DIFF WBC: CPT

## 2022-06-15 PROCEDURE — 93306 TTE W/DOPPLER COMPLETE: CPT | Performed by: INTERNAL MEDICINE

## 2022-06-15 PROCEDURE — 81003 URINALYSIS AUTO W/O SCOPE: CPT | Performed by: FAMILY MEDICINE

## 2022-06-15 PROCEDURE — 84443 ASSAY THYROID STIM HORMONE: CPT

## 2022-06-15 PROCEDURE — 83540 ASSAY OF IRON: CPT

## 2022-06-15 PROCEDURE — 82728 ASSAY OF FERRITIN: CPT

## 2022-06-19 LAB — VIT B1 BLD-SCNC: 174 NMOL/L (ref 66.5–200)

## 2022-06-20 ENCOUNTER — OFFICE VISIT (OUTPATIENT)
Dept: FAMILY MEDICINE CLINIC | Facility: CLINIC | Age: 67
End: 2022-06-20
Payer: MEDICARE

## 2022-06-20 VITALS
TEMPERATURE: 97.4 F | OXYGEN SATURATION: 97 % | HEART RATE: 77 BPM | DIASTOLIC BLOOD PRESSURE: 78 MMHG | WEIGHT: 163 LBS | BODY MASS INDEX: 24.14 KG/M2 | SYSTOLIC BLOOD PRESSURE: 116 MMHG | HEIGHT: 69 IN

## 2022-06-20 DIAGNOSIS — F41.9 ANXIETY: ICD-10-CM

## 2022-06-20 DIAGNOSIS — F43.10 PTSD (POST-TRAUMATIC STRESS DISORDER): ICD-10-CM

## 2022-06-20 DIAGNOSIS — J01.90 SUBACUTE SINUSITIS, UNSPECIFIED LOCATION: ICD-10-CM

## 2022-06-20 DIAGNOSIS — I34.1 MVP (MITRAL VALVE PROLAPSE): Primary | ICD-10-CM

## 2022-06-20 DIAGNOSIS — M67.431 GANGLION CYST OF DORSUM OF RIGHT WRIST: ICD-10-CM

## 2022-06-20 LAB — VIT B6 SERPL-MCNC: 47.9 UG/L (ref 3.4–65.2)

## 2022-06-20 PROCEDURE — 99214 OFFICE O/P EST MOD 30 MIN: CPT | Performed by: FAMILY MEDICINE

## 2022-06-20 RX ORDER — BUSPIRONE HYDROCHLORIDE 5 MG/1
5 TABLET ORAL 2 TIMES DAILY
Qty: 180 TABLET | Refills: 1 | Status: SHIPPED | OUTPATIENT
Start: 2022-06-20

## 2022-06-20 RX ORDER — ALPRAZOLAM 0.25 MG/1
0.25 TABLET ORAL
Qty: 30 TABLET | Refills: 0 | Status: SHIPPED | OUTPATIENT
Start: 2022-06-20

## 2022-06-20 RX ORDER — PROPRANOLOL HYDROCHLORIDE 10 MG/1
10 TABLET ORAL
Qty: 90 TABLET | Refills: 1 | Status: SHIPPED | OUTPATIENT
Start: 2022-06-20 | End: 2022-09-18

## 2022-06-20 RX ORDER — FLUTICASONE PROPIONATE 50 MCG
1 SPRAY, SUSPENSION (ML) NASAL DAILY
Qty: 16 G | Refills: 0 | Status: SHIPPED | OUTPATIENT
Start: 2022-06-20 | End: 2022-07-04

## 2022-06-20 NOTE — PROGRESS NOTES
Assessment/Plan:    Patient's mood is stable will continue buspirone and propanolol for now  Echocardiogram of patient showed mild-to-moderate mitral valve prolapse but stop the valve still functioning well will repeat echo in 2 years unless something change  Discussed blood test results patient detail LDL is 124 glucose is 104 fasting impair  She does admit to eat a lot of sugar advised to cut down  All the other blood work all vitamins are back to normal   Advised shingle vaccine the pharmacy  Will see her back in 4 months follow-up her mood  Will offer pneumonia vaccine at that time in follow-up her sugar  Will start Flonase for her nose with sinus  Medication agreement set up for Xanax for her 30 tablets to last her 6 months  I have spent 30 minutes with Patient  today in which greater than 50% of this time was spent in counseling/coordination of care regarding Diagnostic results, Prognosis, Risks and benefits of tx options and Intructions for management  Problem List Items Addressed This Visit        Respiratory    Subacute sinusitis    Relevant Medications    fluticasone (FLONASE) 50 mcg/act nasal spray       Cardiovascular and Mediastinum    MVP (mitral valve prolapse) - Primary    Relevant Medications    propranolol (INDERAL) 10 mg tablet       Other    Anxiety    Relevant Medications    ALPRAZolam (XANAX) 0 25 mg tablet    busPIRone (BUSPAR) 5 mg tablet    propranolol (INDERAL) 10 mg tablet    PTSD (post-traumatic stress disorder)    Relevant Medications    ALPRAZolam (XANAX) 0 25 mg tablet    busPIRone (BUSPAR) 5 mg tablet    propranolol (INDERAL) 10 mg tablet    Ganglion cyst of dorsum of right wrist            Subjective:      Patient ID: Sean Villavicencio is a 74 y o  female  72-year-old female follow-up mood echocardiogram and blood work results  She had mitral valve prolapse she had to take antibiotics for dental procedure    She has transition from [address] due to insurance coverage to buspirone 5 mg twice a day propanolol at night and her mood is much better than before she feels more calm  She take care of her 30-year-old father and 3year-old grandson  He thinks has been hectic for her  Her previous PCP prescribed Xanax for her 30 tablets will last her 6 months or more she needs refill  She rarely uses since she has been on BuSpar and propanolol  She also complained of sinus pressure for the past 2 weeks  Drainage congestion she has not try any medication  She also have a ganglion cyst on her wrist right wrist that act up when she bended a lot  The following portions of the patient's history were reviewed and updated as appropriate:   Past Medical History:  She has a past medical history of Anxiety and Mitral prolapse ,  _______________________________________________________________________  Medical Problems:  does not have any pertinent problems on file ,  _______________________________________________________________________  Past Surgical History:   has a past surgical history that includes Hysterectomy; Sinus surgery; and Cholecystectomy  ,  _______________________________________________________________________  Family History:  family history includes Anxiety disorder in her maternal grandmother; Atrial fibrillation in her mother; Diabetes in her brother; Heart disease in her brother; Hypertension in her mother; No Known Problems in her father ,  _______________________________________________________________________  Social History:   reports that she has never smoked  She has never used smokeless tobacco  She reports current alcohol use  She reports that she does not use drugs  ,  _______________________________________________________________________  Allergies:  has No Known Allergies     _______________________________________________________________________  Current Outpatient Medications   Medication Sig Dispense Refill    ALPRAZolam (XANAX) 0 25 mg tablet Take 1 tablet (0 25 mg total) by mouth daily at bedtime as needed for anxiety 30 tablet 0    amoxicillin (AMOXIL) 500 mg capsule amoxicillin 500 mg capsule   TAKE 4 CAPSULES 1 HOUR PRIOR TO DENTAL APPOINTMENT      busPIRone (BUSPAR) 5 mg tablet Take 1 tablet (5 mg total) by mouth 2 (two) times a day For anxiety 180 tablet 1    fluticasone (FLONASE) 50 mcg/act nasal spray 1 spray into each nostril daily for 14 days 16 g 0    propranolol (INDERAL) 10 mg tablet Take 1 tablet (10 mg total) by mouth daily at bedtime 90 tablet 1     No current facility-administered medications for this visit      _______________________________________________________________________  Review of Systems   Constitutional: Negative for activity change, appetite change, fatigue and unexpected weight change  HENT: Positive for sinus pressure and sinus pain  Negative for ear pain, sore throat, trouble swallowing and voice change  Eyes: Negative for photophobia and visual disturbance  Respiratory: Negative for cough, chest tightness, shortness of breath and wheezing  Cardiovascular: Negative for chest pain, palpitations and leg swelling  Gastrointestinal: Negative for abdominal pain, constipation, diarrhea, nausea, rectal pain and vomiting  Endocrine: Negative for cold intolerance, polydipsia and polyuria  Genitourinary: Negative for difficulty urinating, dysuria, flank pain, menstrual problem and pelvic pain  Musculoskeletal: Negative for arthralgias, joint swelling and myalgias  Skin: Negative for color change and rash  Allergic/Immunologic: Negative for environmental allergies and immunocompromised state  Neurological: Negative for dizziness, weakness, numbness and headaches  Hematological: Negative for adenopathy  Does not bruise/bleed easily  Psychiatric/Behavioral: Negative for decreased concentration, dysphoric mood, self-injury, sleep disturbance and suicidal ideas  The patient is not nervous/anxious  Objective:  Vitals:    06/20/22 0833   BP: 116/78   BP Location: Left arm   Patient Position: Sitting   Cuff Size: Standard   Pulse: 77   Temp: (!) 97 4 °F (36 3 °C)   TempSrc: Tympanic   SpO2: 97%   Weight: 73 9 kg (163 lb)   Height: 5' 8 75" (1 746 m)     Body mass index is 24 25 kg/m²  Physical Exam  Vitals and nursing note reviewed  Constitutional:       Appearance: Normal appearance  She is well-developed and normal weight  HENT:      Head: Normocephalic and atraumatic  Right Ear: Tympanic membrane, ear canal and external ear normal       Left Ear: Tympanic membrane, ear canal and external ear normal       Nose: Congestion present  Mouth/Throat:      Mouth: Mucous membranes are moist       Pharynx: Posterior oropharyngeal erythema present  No oropharyngeal exudate  Eyes:      Pupils: Pupils are equal, round, and reactive to light  Neck:      Thyroid: No thyromegaly  Cardiovascular:      Rate and Rhythm: Normal rate and regular rhythm  Heart sounds: Normal heart sounds  No murmur heard  Pulmonary:      Effort: Pulmonary effort is normal  No respiratory distress  Breath sounds: Normal breath sounds  No wheezing or rales  Abdominal:      General: Bowel sounds are normal  There is no distension  Palpations: Abdomen is soft  Tenderness: There is no abdominal tenderness  There is no guarding  Genitourinary:     Vagina: Normal    Musculoskeletal:         General: No tenderness  Normal range of motion  Cervical back: Normal range of motion and neck supple  Skin:     General: Skin is warm and dry  Findings: No rash  Neurological:      General: No focal deficit present  Mental Status: She is alert and oriented to person, place, and time  Mental status is at baseline  Psychiatric:         Mood and Affect: Mood normal          Behavior: Behavior normal          Thought Content:  Thought content normal          Judgment: Judgment normal

## 2022-08-01 ENCOUNTER — EVALUATION (OUTPATIENT)
Dept: PHYSICAL THERAPY | Facility: CLINIC | Age: 67
End: 2022-08-01
Payer: MEDICARE

## 2022-08-01 DIAGNOSIS — M25.562 ACUTE PAIN OF LEFT KNEE: Primary | ICD-10-CM

## 2022-08-01 PROCEDURE — 97162 PT EVAL MOD COMPLEX 30 MIN: CPT | Performed by: PHYSICAL THERAPIST

## 2022-08-01 NOTE — LETTER
2022    Mark Flynn PA-C  [address] [address]    Patient: Shanna Peters   YOB: 1955   Date of Visit: 2022     Encounter Diagnosis     ICD-10-CM    1  Acute pain of left knee  M25 562        Dear Dr Luis F Steward: Thank you for your recent referral of Shanna Peters  Please review the attached evaluation summary from Ibeth's recent visit  Please verify that you agree with the plan of care by signing the attached order  If you have any questions or concerns, please do not hesitate to call  I sincerely appreciate the opportunity to share in the care of one of your patients and hope to have another opportunity to work with you in the near future  Sincerely,    Francisco Mtz, PT      Referring Provider:      I certify that I have read the below Plan of Care and certify the need for these services furnished under this plan of treatment while under my care  Amari Mccollum PA-C  [address] [facility]  [place] [id number]  Via Fax: [phone]          PT Evaluation     Today's date: 2022  Patient name: Shanna Peters  :   MRN: [id number]  Referring provider: Judie Sandra*  Dx:   Encounter Diagnosis     ICD-10-CM    1  Acute pain of left knee  M25 562                   Assessment  Assessment details: Shanna Peters is a pleasant 74 y o  female who presents with L knee pain resulting in difficulty with ADLs and functional activities  She demonstrates full knee AROM, but end-range is painful  She has deficits in her motor control and strength of hip musculature and ankle eversion/DF which has left a hypersensitivity and inflammation at the lateral knee  She was agreeable to POC 2x/week to address these deficits and allow return to PLOF     Her greatest concerns are the pain she is experiencing, worry over not knowing what's wrong, concern at no signs of improvement and fear of not being able to keep active  No further referral appears necessary at this time based upon examination results  Positive prognostic indicators include positive attitude toward recovery, good understanding of diagnosis and treatment plan options and absence of observed red flags  Negative prognostic indicators include none  Problem List:  1) hypomobility of fibular head  2) impaired motor control of quad and glute med    Comparable signs:  1) descending stairs  2) resisted ankle eversion  Impairments: abnormal gait, abnormal muscle firing, abnormal movement, activity intolerance, impaired physical strength, pain with function and weight-bearing intolerance    Symptom irritability: moderateUnderstanding of Dx/Px/POC: excellent  Goals  ST  Patient will be able to perform sit to stand without genu valgus in 4 weeks  2  Patient will be able to perform SLR flexion without increase in symptoms in 4 weeks  LT  Patient will be able to tolerate descending stairs in 8 weeks  2  Patient will demonstrate knee extension strength MMT 5/5 in 8 weeks  3  Patient will be independent with home exercise program    4  Patient will be able to manage symptoms independently       Plan  Patient would benefit from: skilled physical therapy  Planned modality interventions: cryotherapy  Planned therapy interventions: home exercise program, flexibility, functional ROM exercises, gait training, graded activity, strengthening, stretching, therapeutic activities, therapeutic exercise, joint mobilization, manual therapy, motor coordination training, neuromuscular re-education, patient education, postural training, abdominal trunk stabilization, activity modification, balance and balance/weight bearing training  Other planned therapy interventions: IASTM  Frequency: 2x week  Duration in weeks: 8  Treatment plan discussed with: patient, PTA and referring physician        Subjective Evaluation    History of Present Illness  Mechanism of injury: She notes that on Wednesday she will have had pain for 3 weeks  She notes that has a habit of crossing her legs  Her two year old grandson was sitting on her foot and she was bouncing him on her foot  She had no pain in the moment, but she got tired  She notes that like two days later she had pain  The pain was in posterior to lateral knee pain  She notes that it doesn't hurt much when she walks  She notes the symptoms vary  She notes that she does get popping in the lateral knee  She notes that she feels a clicking, but denies catching/locking  She saw the doctor a week ago Monday  She notes that she was doing down steps and twisted it because she has narrow steps  She notes that the pain was so bad she could barely walk  She notes that a day or so later it popped and then the pain was fine  She notes that she has baseline pain, but she can walk okay  She denies any swelling  She denies bruising  She denies episodes of buckling, but it hurts when she steps down on it  She babysits her 3year old nirmal all week  She is retired from customer service  She enjoys wineries, shopping, and she enjoys walking every day  Pain  Current pain rating: 3  At best pain ratin  At worst pain ratin  Location: L lateral/posterior knee  Quality: dull ache and sharp  Relieving factors: change in position, rest, ice and medications  Aggravating factors: stair climbing    Patient Goals  Patient goals for therapy: increased motion, improved balance, decreased pain, increased strength, independence with ADLs/IADLs and return to sport/leisure activities  Patient goal: to stop hurting so I can up/down the steps again, be able to do the laundry in the basement        Objective     Tenderness   Left Knee   No tenderness in the fibular head, ITB, lateral joint line, medial joint line, patellar tendon and quadriceps tendon       Right Knee   Tenderness in the ITB, lateral joint line and patellar tendon  No tenderness in the fibular head, medial joint line and quadriceps tendon  Additional Tenderness Details  (+) TTP L anterior tibialis    Active Range of Motion   Left Knee   Flexion: 150 degrees with pain  Extension: 0 degrees     Right Knee   Flexion: 150 degrees   Extension: 0 degrees     Joint Play   Left Ankle/Foot  Hypomobile in the fibular head  Strength/Myotome Testing     Left Hip   Planes of Motion   Flexion: 3+    Right Hip   Planes of Motion   Flexion: 4-    Left Knee   Flexion: 4-  Extension: 4+    Right Knee   Normal strength  Flexion: 4-  Extension: 4+    Left Ankle/Foot   Dorsiflexion: 5  Eversion: 4+    Right Ankle/Foot   Dorsiflexion: 5  Eversion: 5    Additional Strength Details  P! With rested hamstrings on L  *P!  On L posterior lateral knee on release of resisted ankle eversion; improved after PA fibular mobs    Functional Assessment        Comments  Genu valgus with sit to stand; loss of eccentric control with sit to stands              Diagnosis: L acute knee pain  Precautions: N/A      Manuals 8/1            Fibular head mobs RS            IASTM ITB                                       Neuro Re-Ed                          Quad set             SLR              halie             TKE             rockerboard             Luciana Lamer             Ther Ex             Bike                                       Leg Press                                                                  Ther Activity             Sit to stands                          Gait Training                                       Modalities

## 2022-08-01 NOTE — PROGRESS NOTES
PT Evaluation     Today's date: 2022  Patient name: Noris Camacho  :   MRN: [id number]  Referring provider: David Calderon*  Dx:   Encounter Diagnosis     ICD-10-CM    1  Acute pain of left knee  M25 562                   Assessment  Assessment details: Noris Camacho is a pleasant 74 y o  female who presents with L knee pain resulting in difficulty with ADLs and functional activities  She demonstrates full knee AROM, but end-range is painful  She has deficits in her motor control and strength of hip musculature and ankle eversion/DF which has left a hypersensitivity and inflammation at the lateral knee  She was agreeable to POC 2x/week to address these deficits and allow return to PLOF  Her greatest concerns are the pain she is experiencing, worry over not knowing what's wrong, concern at no signs of improvement and fear of not being able to keep active  No further referral appears necessary at this time based upon examination results  Positive prognostic indicators include positive attitude toward recovery, good understanding of diagnosis and treatment plan options and absence of observed red flags  Negative prognostic indicators include none  Problem List:  1) hypomobility of fibular head  2) impaired motor control of quad and glute med    Comparable signs:  1) descending stairs  2) resisted ankle eversion  Impairments: abnormal gait, abnormal muscle firing, abnormal movement, activity intolerance, impaired physical strength, pain with function and weight-bearing intolerance    Symptom irritability: moderateUnderstanding of Dx/Px/POC: excellent  Goals  ST  Patient will be able to perform sit to stand without genu valgus in 4 weeks  2  Patient will be able to perform SLR flexion without increase in symptoms in 4 weeks  LT  Patient will be able to tolerate descending stairs in 8 weeks  2  Patient will demonstrate knee extension strength MMT 5/5 in 8 weeks     3  Patient will be independent with home exercise program    4  Patient will be able to manage symptoms independently  Plan  Patient would benefit from: skilled physical therapy  Planned modality interventions: cryotherapy  Planned therapy interventions: home exercise program, flexibility, functional ROM exercises, gait training, graded activity, strengthening, stretching, therapeutic activities, therapeutic exercise, joint mobilization, manual therapy, motor coordination training, neuromuscular re-education, patient education, postural training, abdominal trunk stabilization, activity modification, balance and balance/weight bearing training  Other planned therapy interventions: IASTM  Frequency: 2x week  Duration in weeks: 8  Treatment plan discussed with: patient, PTA and referring physician        Subjective Evaluation    History of Present Illness  Mechanism of injury: She notes that on Wednesday she will have had pain for 3 weeks  She notes that has a habit of crossing her legs  Her two year old grandson was sitting on her foot and she was bouncing him on her foot  She had no pain in the moment, but she got tired  She notes that like two days later she had pain  The pain was in posterior to lateral knee pain  She notes that it doesn't hurt much when she walks  She notes the symptoms vary  She notes that she does get popping in the lateral knee  She notes that she feels a clicking, but denies catching/locking  She saw the doctor a week ago Monday  She notes that she was doing down steps and twisted it because she has narrow steps  She notes that the pain was so bad she could barely walk  She notes that a day or so later it popped and then the pain was fine  She notes that she has baseline pain, but she can walk okay  She denies any swelling  She denies bruising  She denies episodes of buckling, but it hurts when she steps down on it  She babysits her 3year old granson all week   She is retired from customer service  She enjoys [employer], shopping, and she enjoys walking every day  Pain  Current pain rating: 3  At best pain ratin  At worst pain ratin  Location: L lateral/posterior knee  Quality: dull ache and sharp  Relieving factors: change in position, rest, ice and medications  Aggravating factors: stair climbing    Patient Goals  Patient goals for therapy: increased motion, improved balance, decreased pain, increased strength, independence with ADLs/IADLs and return to sport/leisure activities  Patient goal: to stop hurting so I can up/down the steps again, be able to do the laundry in the basement        Objective     Tenderness   Left Knee   No tenderness in the fibular head, ITB, lateral joint line, medial joint line, patellar tendon and quadriceps tendon  Right Knee   Tenderness in the ITB, lateral joint line and patellar tendon  No tenderness in the fibular head, medial joint line and quadriceps tendon  Additional Tenderness Details  (+) TTP L anterior tibialis    Active Range of Motion   Left Knee   Flexion: 150 degrees with pain  Extension: 0 degrees     Right Knee   Flexion: 150 degrees   Extension: 0 degrees     Joint Play   Left Ankle/Foot  Hypomobile in the fibular head  Strength/Myotome Testing     Left Hip   Planes of Motion   Flexion: 3+    Right Hip   Planes of Motion   Flexion: 4-    Left Knee   Flexion: 4-  Extension: 4+    Right Knee   Normal strength  Flexion: 4-  Extension: 4+    Left Ankle/Foot   Dorsiflexion: 5  Eversion: 4+    Right Ankle/Foot   Dorsiflexion: 5  Eversion: 5    Additional Strength Details  P! With rested hamstrings on L  *P!  On L posterior lateral knee on release of resisted ankle eversion; improved after PA fibular mobs    Functional Assessment        Comments  Genu valgus with sit to stand; loss of eccentric control with sit to stands              Diagnosis: L acute knee pain  Precautions: N/A      Manuals             Fibular head mobs RS IASTM ITB                                       Neuro Re-Ed                          Quad set             SLR              halie             TKE             rockerboard             Hilary Shalini             Ther Ex             Bike                                       Leg Press                                                                  Ther Activity             Sit to stands                          Gait Training                                       Modalities

## 2022-08-02 ENCOUNTER — OFFICE VISIT (OUTPATIENT)
Dept: PHYSICAL THERAPY | Facility: CLINIC | Age: 67
End: 2022-08-02
Payer: MEDICARE

## 2022-08-02 DIAGNOSIS — M25.562 ACUTE PAIN OF LEFT KNEE: Primary | ICD-10-CM

## 2022-08-02 PROCEDURE — 97110 THERAPEUTIC EXERCISES: CPT

## 2022-08-02 PROCEDURE — 97112 NEUROMUSCULAR REEDUCATION: CPT

## 2022-08-02 NOTE — PROGRESS NOTES
Daily Note     Today's date: 2022  Patient name: Liane Fink  :   MRN: [id number]  Referring provider: Pako Hicks*  Dx:   Encounter Diagnosis     ICD-10-CM    1  Acute pain of left knee  M25 562                   Subjective: Pt states that the stretching that the therapist did last night seemed to help and she had less knee pain when she got out of bed this morning  Objective: See treatment diary below      Assessment: Tolerated treatment well  Session focused on muscle activation of quadricep along with strengthening hip and LE musculature  Heel slides to address pt's pain with flexion with pt reporting decreasing discomfort with more reps  Muscle fatigue is noted with strengthening with cues to improve form/technique  Tenderness is noted distal ITB  Pt will benefit from continued therapy          Plan: Continue per plan of care     Diagnosis: L acute knee pain  Precautions: N/A      Manuals            Fibular head mobs RS            IASTM ITB  TH                                     Neuro Re-Ed                          Quad set  20x5"           SLR   2x10 ea           clamshells  20x ea           Bridges   15x           TKE             rockerboard  2' ea           X-walks             Ther Ex             Bike  5' upright           Heel slides  15x                        Leg Press                                                                  Ther Activity             Sit to stands                          Gait Training                                       Modalities

## 2022-08-08 ENCOUNTER — OFFICE VISIT (OUTPATIENT)
Dept: PHYSICAL THERAPY | Facility: CLINIC | Age: 67
End: 2022-08-08
Payer: MEDICARE

## 2022-08-08 DIAGNOSIS — M25.562 ACUTE PAIN OF LEFT KNEE: Primary | ICD-10-CM

## 2022-08-08 PROCEDURE — 97140 MANUAL THERAPY 1/> REGIONS: CPT

## 2022-08-08 PROCEDURE — 97112 NEUROMUSCULAR REEDUCATION: CPT

## 2022-08-08 PROCEDURE — 97110 THERAPEUTIC EXERCISES: CPT

## 2022-08-08 NOTE — PROGRESS NOTES
Daily Note     Today's date: 2022  Patient name: Bhavin Alvares  : [birth year]  MRN: [id number]  Referring provider: Rashid Peña*  Dx:   Encounter Diagnosis     ICD-10-CM    1  Acute pain of left knee  M25 562                   Subjective: Pt states that she felt better after her last visit  She reports decreased pain when walking in general   She states that she has been doing her exercises  Objective: See treatment diary below      Assessment: Tolerated treatment well  Progressed with strengthening ex's as tolerated  Pt has minimal discomfort with squats  Cues needed to avoid knee valgus and to monitor overall knee placement  Less tenderness is noted during STM than last visit  Pt reports overall decrease in sx's  Pt will benefit from continued therapy  Will progress as able          Plan: Continue per plan of care     Diagnosis: L acute knee pain  Precautions: N/A      Manuals           Fibular head mobs RS            IASTM ITB                                      Neuro Re-Ed             Mini squats   10x          Quad set  20x5" 20x5"          SLR   2x10 ea 2x10 ea          clamshells  20x ea           Bridges   15x 2x10          TKE   5# 20x          rockerboard  2' ea 2' ea          X-walks             Ther Ex             Bike  5' upright 5' upright          Heel slides  15x 20x          Hamstring stretch   10x10"          Leg Press                                                                  Ther Activity             Sit to stands                          Gait Training                                       Modalities

## 2022-08-11 ENCOUNTER — OFFICE VISIT (OUTPATIENT)
Dept: PHYSICAL THERAPY | Facility: CLINIC | Age: 67
End: 2022-08-11
Payer: MEDICARE

## 2022-08-11 DIAGNOSIS — M25.562 ACUTE PAIN OF LEFT KNEE: Primary | ICD-10-CM

## 2022-08-11 PROCEDURE — 97112 NEUROMUSCULAR REEDUCATION: CPT

## 2022-08-11 PROCEDURE — 97110 THERAPEUTIC EXERCISES: CPT

## 2022-08-11 NOTE — PROGRESS NOTES
Daily Note     Today's date: 2022  Patient name: Antonio Barkley  :   MRN: [id number]  Referring provider: Charlie Fermin  Dx:   Encounter Diagnosis     ICD-10-CM    1  Acute pain of left knee  M25 562                   Subjective: Pt states that she was tired and sore after her last visit but not painful  She reports that she does notice some discomfort going up stairs in addition to down  Objective: See treatment diary below      Assessment: Tolerated treatment well  Progressed pt with strengthening ex's as able  Pt reports minimal discomfort with step ups  Therapist used tband to give slight resistance laterally at knee with pt reporting sx's nearly abolished  Pt performed quad sets for 10x10" to improve endurance of quad contraction  Discussed HEP and importance with pt reporting understanding  Pt progressing slowly towards her goals          Plan: Continue per plan of care     Diagnosis: L acute knee pain  Precautions: N/A      Manuals          Fibular head mobs RS            IASTM ITB  TH TH                                    Neuro Re-Ed             Mini squats   10x          Quad set  20x5" 20x5" 10x10"         SLR   2x10 ea 2x10 ea 2x10 ea         clamshells  20x ea  20x ea         Bridges   15x 2x10 2x10 2"         TKE   5# 20x 5# 20x         rockerboard  2' ea 2' ea 2' ea         SLS    3x30"         X-walks             Ther Ex             Bike  5' upright 5' upright 5'          Heel slides  15x 20x          Hamstring stretch   10x10" 10x10"         Leg Press     70# 2x10                                                             Ther Activity             Sit to stands             Step ups    6" 15x on L with band         Gait Training                                       Modalities

## 2022-08-15 ENCOUNTER — OFFICE VISIT (OUTPATIENT)
Dept: PHYSICAL THERAPY | Facility: CLINIC | Age: 67
End: 2022-08-15
Payer: MEDICARE

## 2022-08-15 DIAGNOSIS — M25.562 ACUTE PAIN OF LEFT KNEE: Primary | ICD-10-CM

## 2022-08-15 PROCEDURE — 97112 NEUROMUSCULAR REEDUCATION: CPT

## 2022-08-15 PROCEDURE — 97110 THERAPEUTIC EXERCISES: CPT

## 2022-08-15 NOTE — PROGRESS NOTES
Daily Note     Today's date: 8/15/2022  Patient name: Frank Miller  : [birth year]  MRN: [id number]  Referring provider: Sharmin Torres*  Dx:   Encounter Diagnosis     ICD-10-CM    1  Acute pain of left knee  M25 562                   Subjective: Pt states that she did a little too much weed wacking this past weekend and her knee was really aggravated  Pt reports that the next day was better and she could do the stairs without too much difficulty  Objective: See treatment diary below      Assessment: Tolerated treatment well  Continued with outlined program and progressed with strengthening as able  Pt challenged with progressions and muscle fatigue is noted  Pt reports increased lateral knee pain during knee press but has no other complaints  ITB stretch performed which nearly abolished her lateral knee pain  Reviewed HEP with printout given with pt reporting understanding  Will continue to progress as able          Plan: Continue per plan of care     Diagnosis: L acute knee pain  Precautions: N/A      Manuals 8/1 8/2 8/8 8/11 8/15        Fibular head mobs RS            IASTM ITB                                      Neuro Re-Ed             Mini squats   10x          Quad set  20x5" 20x5" 10x10"         SLR   2x10 ea 2x10 ea 2x10 ea 2x10  ea        clamshells  20x ea  20x ea RTB: 20x ea        Bridges   15x 2x10 2x10 2" RTB:  2x10 2"        TKE   5# 20x 5# 20x 7# 20x        rockerboard  2' ea 2' ea 2' ea 2' ea        SLS    3x30" Foam  3x30"        X-walks     NV        Ther Ex             Bike  5' upright 5' upright 5'  5'        Heel slides  15x 20x          Hamstring stretch   10x10" 10x10" 10x10"        Leg Press     70# 2x10 75#  2x10        ITB stretch     10x10" ea                                               Ther Activity             Sit to stands             Step ups    6" 15x on L with band         Gait Training                                       Modalities

## 2022-08-16 ENCOUNTER — APPOINTMENT (OUTPATIENT)
Dept: PHYSICAL THERAPY | Facility: CLINIC | Age: 67
End: 2022-08-16
Payer: MEDICARE

## 2022-08-17 DIAGNOSIS — J01.90 SUBACUTE SINUSITIS, UNSPECIFIED LOCATION: ICD-10-CM

## 2022-08-17 RX ORDER — FLUTICASONE PROPIONATE 50 MCG
SPRAY, SUSPENSION (ML) NASAL
Qty: 16 ML | Refills: 0 | Status: SHIPPED | OUTPATIENT
Start: 2022-08-17

## 2022-08-18 ENCOUNTER — OFFICE VISIT (OUTPATIENT)
Dept: PHYSICAL THERAPY | Facility: CLINIC | Age: 67
End: 2022-08-18
Payer: MEDICARE

## 2022-08-18 DIAGNOSIS — M25.562 ACUTE PAIN OF LEFT KNEE: Primary | ICD-10-CM

## 2022-08-18 PROCEDURE — 97112 NEUROMUSCULAR REEDUCATION: CPT

## 2022-08-18 PROCEDURE — 97110 THERAPEUTIC EXERCISES: CPT

## 2022-08-18 NOTE — PROGRESS NOTES
Daily Note     Today's date: 2022  Patient name: Kyung Dietrich  :   MRN: [id number]  Referring provider: Aly Bear*  Dx:   Encounter Diagnosis     ICD-10-CM    1  Acute pain of left knee  M25 562                   Subjective: Pt reports that she is having less pain, she notices stairs are easier and less painful  Pt states that she still had some when she went on a longer walk  Objective: See treatment diary below      Assessment: Tolerated treatment well  Progressing pt with strengthening ex's as tolerated  No reports of increased knee pain with progressions  Multiple cues needed for min squats for proper form and technique  Pt able to correct with cues  Discussed importance of HEP with pt reporting understanding  Will progress as able  Pt progressing slowly towards her goals          Plan: Continue per plan of care     Diagnosis: L acute knee pain  Precautions: N/A      Manuals 8/1 8/2 8/8 8/11 8/15 8/18       Fibular head mobs RS              IASTM ITB  TH TH                                    Neuro Re-Ed             Mini squats   10x   TRX: 10x       Quad set  20x5" 20x5" 10x10"         SLR   2x10 ea 2x10 ea 2x10 ea 2x10  ea 2x10 ea       clamshells  20x ea  20x ea RTB: 20x ea RTB:  20x ea       Bridges   15x 2x10 2x10 2" RTB:  2x10 2" RTB  2x10  2"       TKE   5# 20x 5# 20x 7# 20x        rockerboard  2' ea 2' ea 2' ea 2' ea        Hip hinge with cane for feedback      15x       SLS    3x30" Foam  3x30" Foam  3x30"       X-walks     NV Red: 1x       Ther Ex             Bike  5' upright 5' upright 5'  5' 5'       Heel slides  15x 20x          Hamstring stretch   10x10" 10x10" 10x10" 10x10"       Leg Press     70# 2x10 75#  2x10 85#  2x10       Leg press SL       45# 15x       ITB stretch     10x10" ea                                               Ther Activity             Sit to stands             Step ups    6" 15x on L with band  6"   2x10 on L       Gait Training Modalities

## 2022-08-22 ENCOUNTER — OFFICE VISIT (OUTPATIENT)
Dept: PHYSICAL THERAPY | Facility: CLINIC | Age: 67
End: 2022-08-22
Payer: MEDICARE

## 2022-08-22 DIAGNOSIS — M25.562 ACUTE PAIN OF LEFT KNEE: Primary | ICD-10-CM

## 2022-08-22 PROCEDURE — 97112 NEUROMUSCULAR REEDUCATION: CPT

## 2022-08-22 PROCEDURE — 97110 THERAPEUTIC EXERCISES: CPT

## 2022-08-22 NOTE — PROGRESS NOTES
Daily Note     Today's date: 2022  Patient name: Mamadou Andino  :   MRN: [id number]  Referring provider: Hans Duggan*  Dx:   Encounter Diagnosis     ICD-10-CM    1  Acute pain of left knee  M25 562                   Subjective: Pt states that she had some increased hip pain after her last visit, she thinks that it was from the x-walks  Overall she feels like her knees are getting better  She is able to walk on flat ground longer with little to no pain  Objective: See treatment diary below      Assessment: Tolerated treatment well  Progressed pt with strengthening ex's as able with no reports of increased pain sx's  Pt especially challenged with side steps with resistance and cues needed to avoid compensations  Muscle fatigue is noted  Cues to avoid knee valgus during TRX squats with fair carryover noted  Pt progressing slowly towards her goals and will benefit from continued therapy          Plan: Continue per plan of care     Diagnosis: L acute knee pain  Precautions: N/A      Manuals 8/1 8/2 8/8 8/11 8/15 8/18 8/22      Fibular head mobs RS              IASTM ITB   TH                                    Neuro Re-Ed             Mini squats   10x   TRX: 10x TRX: 20x      Quad set  20x5" 20x5" 10x10"         SLR   2x10 ea 2x10 ea 2x10 ea 2x10  ea 2x10 ea       clamshells  20x ea  20x ea RTB: 20x ea RTB:  20x ea GTB:  20x ea      Bridges   15x 2x10 2x10 2" RTB:  2x10 2" RTB  2x10  2" GTB  2x10  2"      TKE   5# 20x 5# 20x 7# 20x  7# 20x      rockerboard  2' ea 2' ea 2' ea 2' ea        Hip hinge with cane for feedback      15x 20x      SLS    3x30" Foam  3x30" Foam  3x30" Foam  3x30"      Tandem balance on foam       2x30" ea      X-walks     NV Red: 1x Side steps RTB:3x      Ther Ex             Bike  5' upright 5' upright 5'  5' 5' 5'      Heel slides  15x 20x          Hamstring stretch   10x10" 10x10" 10x10" 10x10" 10x10"      Leg Press     70# 2x10 75#  2x10 85#  2x10 85#  2x10      Leg press SL       45# 15x       ITB stretch     10x10" ea                                               Ther Activity             Sit to stands             Step ups    6" 15x on L with band  6"   2x10 on L       Gait Training                                       Modalities

## 2022-08-25 ENCOUNTER — OFFICE VISIT (OUTPATIENT)
Dept: PHYSICAL THERAPY | Facility: CLINIC | Age: 67
End: 2022-08-25
Payer: MEDICARE

## 2022-08-25 DIAGNOSIS — M25.562 ACUTE PAIN OF LEFT KNEE: Primary | ICD-10-CM

## 2022-08-25 PROCEDURE — 97112 NEUROMUSCULAR REEDUCATION: CPT

## 2022-08-25 PROCEDURE — 97110 THERAPEUTIC EXERCISES: CPT

## 2022-08-25 PROCEDURE — 97530 THERAPEUTIC ACTIVITIES: CPT

## 2022-08-25 NOTE — PROGRESS NOTES
Daily Note     Today's date: 2022  Patient name: Alf Rosales  : [birth year]  MRN: [id number]  Referring provider: Liat Irwin*  Dx:   Encounter Diagnosis     ICD-10-CM    1  Acute pain of left knee  M25 562                   Subjective: Pt states that she is trying to slowly increase her activity at home  She can tell she is improving  Objective: See treatment diary below      Assessment: Tolerated treatment well  Progressed pt as able with strengthening  Pt challenged with progressions and muscle fatigue is noted  Cues for form needed with progressions, and to avoid knee valgus  Pt reports increased sx's with heel taps, with manual assist from therapist laterally pt's sx's were alleviated  Tenderness/tightness reported at distal ITB at end of session which was decreased after STM  Reviewed HEP with pt reporting understanding and compliance  She is progressing slowly towards her goals and will benefit from continued therapy          Plan: Continue per plan of care     Diagnosis: L acute knee pain  Precautions: N/A      Manuals 8/1 8/2 8/8 8/11 8/15 8/18 8/22 8/25     Fibular head mobs RS              IASTM ITB   TH     STM distal: TH                               Neuro Re-Ed             Mini squats   10x   TRX: 10x TRX: 20x TRX:  20x     Quad set  20x5" 20x5" 10x10"         SLR   2x10 ea 2x10 ea 2x10 ea 2x10  ea 2x10 ea       clamshells  20x ea  20x ea RTB: 20x ea RTB:  20x ea GTB:  20x ea      Bridges   15x 2x10 2x10 2" RTB:  2x10 2" RTB  2x10  2" GTB  2x10  2" SB  15x     TKE   5# 20x 5# 20x 7# 20x  7# 20x      rockerboard  2' ea 2' ea 2' ea 2' ea        Hip hinge with cane for feedback      15x 20x      SLS    3x30" Foam  3x30" Foam  3x30" Foam  3x30"      Tandem balance on foam       2x30" ea 2x30" ea     Tandem ambulation on foam: f/b        2x     Stool scoots             X-walks     NV Red: 1x Side steps RTB:3x Side steps:  RTB     Ther Ex             Bike  5' upright 5' upright 5'  5' 5' 5' 5'     Heel slides  15x 20x          Hamstring stretch   10x10" 10x10" 10x10" 10x10" 10x10" 10x10"     Leg Press     70# 2x10 75#  2x10 85#  2x10 85#  2x10 85#  2x10     Leg press SL       45# 15x  45# 2x10     ITB stretch     10x10" ea        Seated hamstring curl        GTB  10x2                               Ther Activity             Heel taps        4" 15x band assist on L pain!      Sit to stands             Step ups    6" 15x on L with band  6"   2x10 on L  8"  2x10 on L     Gait Training                                       Modalities

## 2022-08-29 ENCOUNTER — EVALUATION (OUTPATIENT)
Dept: PHYSICAL THERAPY | Facility: CLINIC | Age: 67
End: 2022-08-29
Payer: MEDICARE

## 2022-08-29 DIAGNOSIS — M25.562 ACUTE PAIN OF LEFT KNEE: Primary | ICD-10-CM

## 2022-08-29 PROCEDURE — 97110 THERAPEUTIC EXERCISES: CPT | Performed by: PHYSICAL THERAPIST

## 2022-08-29 PROCEDURE — 97140 MANUAL THERAPY 1/> REGIONS: CPT | Performed by: PHYSICAL THERAPIST

## 2022-08-29 NOTE — PROGRESS NOTES
PT Re-Evaluation     Today's date: 2022  Patient name: Zoë Thompson  :   MRN: [id number]  Referring provider: Jessy Erazo*  Dx:   Encounter Diagnosis     ICD-10-CM    1  Acute pain of left knee  M25 562                   Assessment  Assessment details: Zoë Thompson is a pleasant 74 y o  female who presents with L knee pain resulting in difficulty with ADLs and functional activities  She demonstrates full knee AROM  She has improved deficits in her motor control and strength of hip musculature which has improved her ability to perform functional mobility such as stairs  She has improved ankle eversion/DF which has left a hypersensitivity and inflammation at the lateral knee  She continues to be most limited in her hip strength overall  She was agreeable to continued POC 2x/week to address these deficits and allow return to PLOF  Her greatest concerns are the pain she is experiencing, worry over not knowing what's wrong, concern at no signs of improvement and fear of not being able to keep active  No further referral appears necessary at this time based upon examination results  Positive prognostic indicators include positive attitude toward recovery, good understanding of diagnosis and treatment plan options and absence of observed red flags  Negative prognostic indicators include none  Problem List:  1) hypomobility of fibular head  2) impaired motor control of quad and glute med    Comparable signs:  1) descending stairs  2) resisted ankle eversion  Impairments: abnormal gait, abnormal muscle firing, abnormal movement, activity intolerance, impaired physical strength, pain with function and weight-bearing intolerance    Symptom irritability: moderateUnderstanding of Dx/Px/POC: excellent  Goals  ST  Patient will be able to perform sit to stand without genu valgus in 4 weeks  - improved  2   Patient will be able to perform SLR flexion without increase in symptoms in 4 weeks  - met  3  Patient will be able to perform squat to depth greater than parallel in 4 weeks  LT  Patient will be able to tolerate descending stairs in 8 weeks  - partially met  2  Patient will demonstrate knee extension strength MMT 5/5 in 8 weeks  - met  3  Patient will be independent with home exercise program    4  Patient will be able to manage symptoms independently  5  Patient will be able to perform floor transfers in 8 weeks  Plan  Patient would benefit from: skilled physical therapy  Planned modality interventions: cryotherapy  Planned therapy interventions: home exercise program, flexibility, functional ROM exercises, gait training, graded activity, strengthening, stretching, therapeutic activities, therapeutic exercise, joint mobilization, manual therapy, motor coordination training, neuromuscular re-education, patient education, postural training, abdominal trunk stabilization, activity modification, balance and balance/weight bearing training  Other planned therapy interventions: IASTM  Frequency: 2x week  Duration in weeks: 4  Treatment plan discussed with: patient, PTA and referring physician        Subjective Evaluation    History of Present Illness  Mechanism of injury: (IE) She notes that on Wednesday she will have had pain for 3 weeks  She notes that has a habit of crossing her legs  Her two year old grandson was sitting on her foot and she was bouncing him on her foot  She had no pain in the moment, but she got tired  She notes that like two days later she had pain  The pain was in posterior to lateral knee pain  She notes that it doesn't hurt much when she walks  She notes the symptoms vary  She notes that she does get popping in the lateral knee  She notes that she feels a clicking, but denies catching/locking  She saw the doctor a week ago Monday  She notes that she was doing down steps and twisted it because she has narrow steps   She notes that the pain was so bad she could barely walk  She notes that a day or so later it popped and then the pain was fine  She notes that she has baseline pain, but she can walk okay  She denies any swelling  She denies bruising  She denies episodes of buckling, but it hurts when she steps down on it  She babysits her 3year old nirmal all week  She is retired from customer service  She enjoys wineries, shopping, and she enjoys walking every day  Pain  Current pain ratin  At best pain ratin  At worst pain rating: 3  Location: L lateral/posterior knee  Quality: dull ache and sharp  Relieving factors: change in position, rest, ice and medications  Aggravating factors: stair climbing  Progression: improved    Patient Goals  Patient goals for therapy: increased motion, improved balance, decreased pain, increased strength, independence with ADLs/IADLs and return to sport/leisure activities  Patient goal: to stop hurting so I can up/down the steps again- partially met, be able to do the laundry in the basement - progressing    Patient reports 80% improvement since start of PT  She notes that overall she is feeling better  She notes that she gauges it based on going up/down the steps  She notes that she could go pretty much normal  She notes that she still feels it and she's careful, but she was able to do it  She notes that she can go for 2 mile walks and she walked pretty well  She notes that on uneven terrain she still remains limited in grass  She notes that generally the pain is better  She doesn't have baseline  Objective     Tenderness   Left Knee   No tenderness in the fibular head, ITB, lateral joint line, medial joint line, patellar tendon and quadriceps tendon  Right Knee   No tenderness in the fibular head, ITB, lateral joint line, medial joint line, patellar tendon and quadriceps tendon       Additional Tenderness Details  (-) TTP L anterior tibialis    *TTP with L fibular head    *findings were entered on contralateral LE in error at IE    Active Range of Motion   Left Knee   Flexion: 150 degrees with pain  Extension: 0 degrees     Right Knee   Flexion: 150 degrees   Extension: 0 degrees     Joint Play   Left Ankle/Foot  Hypomobile in the fibular head  Strength/Myotome Testing     Left Hip   Planes of Motion   Flexion: 4  Abduction: 4-  External rotation: 4-    Right Hip   Planes of Motion   Flexion: 4  Abduction: 4+  External rotation: 5    Left Knee   Flexion: 4  Extension: 5    Right Knee   Normal strength  Flexion: 4-  Extension: 4+    Left Ankle/Foot   Dorsiflexion: 5  Eversion: 5    Right Ankle/Foot   Dorsiflexion: 5  Eversion: 5    Additional Strength Details  *P!  On L posterior lateral knee on release of resisted ankle eversion; improved after PA fibular mobs (IMPROVED)    Functional Assessment        Comments  Genu valgus with sit to stand; loss of eccentric control with sit to stands               iagnosis: L acute knee pain  Precautions: N/A      Manuals 8/1 8/2 8/8 8/11 8/15 8/18 8/22 8/25 8/29    Fibular head mobs RS              IASTM ITB  TH TH     STM distal: TH                  Re-Evaluation         RS    Neuro Re-Ed             Mini squats   10x   TRX: 10x TRX: 20x TRX:  20x increase depth nv     Quad set  20x5" 20x5" 10x10"         SLR   2x10 ea 2x10 ea 2x10 ea 2x10  ea 2x10 ea       clamshells  20x ea  20x ea RTB: 20x ea RTB:  20x ea GTB:  20x ea      Bridges   15x 2x10 2x10 2" RTB:  2x10 2" RTB  2x10  2" GTB  2x10  2" SB  15x     TKE   5# 20x 5# 20x 7# 20x  7# 20x      rockerboard  2' ea 2' ea 2' ea 2' ea        Hip hinge with cane for feedback      15x 20x      SLS    3x30" Foam  3x30" Foam  3x30" Foam  3x30"      Tandem balance on foam       2x30" ea 2x30" ea     Tandem ambulation on foam: f/b        2x     Stool scoots         nv *   X-walks     NV Red: 1x Side steps RTB:3x Side steps:  RTB RTB side steps  3x    Ther Ex             Bike  5' upright 5' upright 5'  5' 5' 5' 5' 5'     Heel slides  15x 20x          Hamstring stretch   10x10" 10x10" 10x10" 10x10" 10x10" 10x10"     Leg Press     70# 2x10 75#  2x10 85#  2x10 85#  2x10 85#  2x10     Leg press SL       45# 15x  45# 2x10     ITB stretch     10x10" ea        Seated hamstring curl        GTB  10x2     Piriformis stretch         10x10"                  Ther Activity             Heel taps        4" 15x band assist on L pain!      Sit to stands             Step ups    6" 15x on L with band  6"   2x10 on L  8"  2x10 on L     Gait Training                                       Modalities

## 2022-08-29 NOTE — LETTER
2022    John Godinez PA-C  [address] [facility] [address]    Patient: Colette Walker   YOB: 1955   Date of Visit: 2022     Encounter Diagnosis     ICD-10-CM    1  Acute pain of left knee  M25 562        Dear Dr Radha Thomas: Thank you for your recent referral of Colette Walker  Please review the attached evaluation summary from Ibeth's recent visit  Please verify that you agree with the plan of care by signing the attached order  If you have any questions or concerns, please do not hesitate to call  I sincerely appreciate the opportunity to share in the care of one of your patients and hope to have another opportunity to work with you in the near future  Sincerely,    Breonna Ramirez, PT      Referring Provider:      I certify that I have read the below Plan of Care and certify the need for these services furnished under this plan of treatment while under my care  Amari Harper PA-C  [address] [facility]  [place] [address]  Via Fax: [phone]          PT Re-Evaluation     Today's date: 2022  Patient name: Colette Walker  :   MRN: [id number]  Referring provider: Kristopher Wells*  Dx:   Encounter Diagnosis     ICD-10-CM    1  Acute pain of left knee  M25 562                   Assessment  Assessment details: Colette Walker is a pleasant 74 y o  female who presents with L knee pain resulting in difficulty with ADLs and functional activities  She demonstrates full knee AROM  She has improved deficits in her motor control and strength of hip musculature which has improved her ability to perform functional mobility such as stairs  She has improved ankle eversion/DF which has left a hypersensitivity and inflammation at the lateral knee  She continues to be most limited in her hip strength overall   She was agreeable to continued POC 2x/week to address these deficits and allow return to PLOF  Her greatest concerns are the pain she is experiencing, worry over not knowing what's wrong, concern at no signs of improvement and fear of not being able to keep active  No further referral appears necessary at this time based upon examination results  Positive prognostic indicators include positive attitude toward recovery, good understanding of diagnosis and treatment plan options and absence of observed red flags  Negative prognostic indicators include none  Problem List:  1) hypomobility of fibular head  2) impaired motor control of quad and glute med    Comparable signs:  1) descending stairs  2) resisted ankle eversion  Impairments: abnormal gait, abnormal muscle firing, abnormal movement, activity intolerance, impaired physical strength, pain with function and weight-bearing intolerance    Symptom irritability: moderateUnderstanding of Dx/Px/POC: excellent  Goals  ST  Patient will be able to perform sit to stand without genu valgus in 4 weeks  - improved  2  Patient will be able to perform SLR flexion without increase in symptoms in 4 weeks  - met  3  Patient will be able to perform squat to depth greater than parallel in 4 weeks  LT  Patient will be able to tolerate descending stairs in 8 weeks  - partially met  2  Patient will demonstrate knee extension strength MMT 5/5 in 8 weeks  - met  3  Patient will be independent with home exercise program    4  Patient will be able to manage symptoms independently  5  Patient will be able to perform floor transfers in 8 weeks       Plan  Patient would benefit from: skilled physical therapy  Planned modality interventions: cryotherapy  Planned therapy interventions: home exercise program, flexibility, functional ROM exercises, gait training, graded activity, strengthening, stretching, therapeutic activities, therapeutic exercise, joint mobilization, manual therapy, motor coordination training, neuromuscular re-education, patient education, postural training, abdominal trunk stabilization, activity modification, balance and balance/weight bearing training  Other planned therapy interventions: IASTM  Frequency: 2x week  Duration in weeks: 4  Treatment plan discussed with: patient, PTA and referring physician        Subjective Evaluation    History of Present Illness  Mechanism of injury: (IE) She notes that on Wednesday she will have had pain for 3 weeks  She notes that has a habit of crossing her legs  Her two year old grandson was sitting on her foot and she was bouncing him on her foot  She had no pain in the moment, but she got tired  She notes that like two days later she had pain  The pain was in posterior to lateral knee pain  She notes that it doesn't hurt much when she walks  She notes the symptoms vary  She notes that she does get popping in the lateral knee  She notes that she feels a clicking, but denies catching/locking  She saw the doctor a week ago Monday  She notes that she was doing down steps and twisted it because she has narrow steps  She notes that the pain was so bad she could barely walk  She notes that a day or so later it popped and then the pain was fine  She notes that she has baseline pain, but she can walk okay  She denies any swelling  She denies bruising  She denies episodes of buckling, but it hurts when she steps down on it  She babysits her 3year old granson all week  She is retired from customer service  She enjoys wineries, shopping, and she enjoys walking every day     Pain  Current pain ratin  At best pain ratin  At worst pain rating: 3  Location: L lateral/posterior knee  Quality: dull ache and sharp  Relieving factors: change in position, rest, ice and medications  Aggravating factors: stair climbing  Progression: improved    Patient Goals  Patient goals for therapy: increased motion, improved balance, decreased pain, increased strength, independence with ADLs/IADLs and return to sport/leisure activities  Patient goal: to stop hurting so I can up/down the steps again- partially met, be able to do the laundry in the basement - progressing    Patient reports 80% improvement since start of PT  She notes that overall she is feeling better  She notes that she gauges it based on going up/down the steps  She notes that she could go pretty much normal  She notes that she still feels it and she's careful, but she was able to do it  She notes that she can go for 2 mile walks and she walked pretty well  She notes that on uneven terrain she still remains limited in grass  She notes that generally the pain is better  She doesn't have baseline  Objective     Tenderness   Left Knee   No tenderness in the fibular head, ITB, lateral joint line, medial joint line, patellar tendon and quadriceps tendon  Right Knee   No tenderness in the fibular head, ITB, lateral joint line, medial joint line, patellar tendon and quadriceps tendon  Additional Tenderness Details  (-) TTP L anterior tibialis    *TTP with L fibular head    *findings were entered on contralateral LE in error at IE    Active Range of Motion   Left Knee   Flexion: 150 degrees with pain  Extension: 0 degrees     Right Knee   Flexion: 150 degrees   Extension: 0 degrees     Joint Play   Left Ankle/Foot  Hypomobile in the fibular head  Strength/Myotome Testing     Left Hip   Planes of Motion   Flexion: 4  Abduction: 4-  External rotation: 4-    Right Hip   Planes of Motion   Flexion: 4  Abduction: 4+  External rotation: 5    Left Knee   Flexion: 4  Extension: 5    Right Knee   Normal strength  Flexion: 4-  Extension: 4+    Left Ankle/Foot   Dorsiflexion: 5  Eversion: 5    Right Ankle/Foot   Dorsiflexion: 5  Eversion: 5    Additional Strength Details  *P!  On L posterior lateral knee on release of resisted ankle eversion; improved after PA fibular mobs (IMPROVED)    Functional Assessment        Comments  Genu valgus with sit to stand; loss of eccentric control with sit to stands               iagnosis: L acute knee pain  Precautions: N/A      Manuals 8/1 8/2 8/8 8/11 8/15 8/18 8/22 8/25 8/29    Fibular head mobs RS              IASTM ITB  TH TH     STM distal: TH                  Re-Evaluation         RS    Neuro Re-Ed             Mini squats   10x   TRX: 10x TRX: 20x TRX:  20x increase depth nv     Quad set  20x5" 20x5" 10x10"         SLR   2x10 ea 2x10 ea 2x10 ea 2x10  ea 2x10 ea       clamshells  20x ea  20x ea RTB: 20x ea RTB:  20x ea GTB:  20x ea      Bridges   15x 2x10 2x10 2" RTB:  2x10 2" RTB  2x10  2" GTB  2x10  2" SB  15x     TKE   5# 20x 5# 20x 7# 20x  7# 20x      rockerboard  2' ea 2' ea 2' ea 2' ea        Hip hinge with cane for feedback      15x 20x      SLS    3x30" Foam  3x30" Foam  3x30" Foam  3x30"      Tandem balance on foam       2x30" ea 2x30" ea     Tandem ambulation on foam: f/b        2x     Stool scoots         nv *   X-walks     NV Red: 1x Side steps RTB:3x Side steps:  RTB RTB side steps  3x    Ther Ex             Bike  5' upright 5' upright 5'  5' 5' 5' 5' 5'     Heel slides  15x 20x          Hamstring stretch   10x10" 10x10" 10x10" 10x10" 10x10" 10x10"     Leg Press     70# 2x10 75#  2x10 85#  2x10 85#  2x10 85#  2x10     Leg press SL       45# 15x  45# 2x10     ITB stretch     10x10" ea        Seated hamstring curl        GTB  10x2     Piriformis stretch         10x10"                  Ther Activity             Heel taps        4" 15x band assist on L pain!      Sit to stands             Step ups    6" 15x on L with band  6"   2x10 on L  8"  2x10 on L     Gait Training                                       Modalities

## 2022-08-30 ENCOUNTER — APPOINTMENT (OUTPATIENT)
Dept: PHYSICAL THERAPY | Facility: CLINIC | Age: 67
End: 2022-08-30
Payer: MEDICARE

## 2022-09-01 ENCOUNTER — OFFICE VISIT (OUTPATIENT)
Dept: PHYSICAL THERAPY | Facility: CLINIC | Age: 67
End: 2022-09-01
Payer: MEDICARE

## 2022-09-01 DIAGNOSIS — M25.562 ACUTE PAIN OF LEFT KNEE: Primary | ICD-10-CM

## 2022-09-01 PROCEDURE — 97112 NEUROMUSCULAR REEDUCATION: CPT

## 2022-09-01 PROCEDURE — 97110 THERAPEUTIC EXERCISES: CPT

## 2022-09-01 NOTE — PROGRESS NOTES
Daily Note     Today's date: 2022  Patient name: Jesika Quintana  :   MRN: [id number]  Referring provider: Kendra Paz*  Dx:   Encounter Diagnosis     ICD-10-CM    1  Acute pain of left knee  M25 562                   Subjective: Pt states that she walked about 8,000 steps yesterday and that did irritate her knee some  It was just a little achy  Overall she still feels it is better  Objective: See treatment diary below      Assessment: Tolerated treatment well  Progressed pt with all strengthening ex's with pt reporting no increased pain sx's  Pt challenged with progressions and muscle fatigue is noted  Cues for form needed, especially with SL hip hinge  Pt has tendency to rotate her hips and requires tactile cues  Discussed HEP with pt reporting compliance  Will continue to progress as able          Plan: Continue per plan of care     iagnosis: L acute knee pain  Precautions: N/A      Manuals 8/1 8/2 8/8 8/11 8/15 8/18 8/22 8/25 8/29 9/1   Fibular head mobs RS              IASTM ITB   TH     STM distal: TH                  Re-Evaluation         RS    Neuro Re-Ed             Mini squats   10x   TRX: 10x TRX: 20x TRX:  20x increase depth nv  nv   Quad set  20x5" 20x5" 10x10"         SLR   2x10 ea 2x10 ea 2x10 ea 2x10  ea 2x10 ea       clamshells  20x ea  20x ea RTB: 20x ea RTB:  20x ea GTB:  20x ea      Bridges   15x 2x10 2x10 2" RTB:  2x10 2" RTB  2x10  2" GTB  2x10  2" SB  15x  SB   2x10   TKE   5# 20x 5# 20x 7# 20x  7# 20x      rockerboard  2' ea 2' ea 2' ea 2' ea        Hip hinge with cane for feedback      15x 20x   SL  10x ea   Hamstring curls seated          10x2 ea   SLS    3x30" Foam  3x30" Foam  3x30" Foam  3x30"   Foam  3x30"   Tandem balance on foam       2x30" ea 2x30" ea     Tandem ambulation on foam: f/b        2x  3x   Stool scoots         nv 4x at blue line   Heel clicks          3"X48   X-walks     NV Red: 1x Side steps RTB:3x Side steps:  RTB RTB side steps  3x GTB  Side steps  3x   Ther Ex             Bike  5' upright 5' upright 5'  5' 5' 5' 5' 5'  5'   Heel slides  15x 20x          Hamstring stretch   10x10" 10x10" 10x10" 10x10" 10x10" 10x10"     Leg Press     70# 2x10 75#  2x10 85#  2x10 85#  2x10 85#  2x10  90#  2x10   Leg press SL       45# 15x  45# 2x10  50#  2x10   ITB stretch     10x10" ea        Seated hamstring curl        GTB  10x2     Piriformis stretch         10x10"                  Ther Activity             Heel taps        4" 15x band assist on L pain!      Sit to stands             Step ups    6" 15x on L with band  6"   2x10 on L  8"  2x10 on L     Gait Training                                       Modalities

## 2022-09-06 ENCOUNTER — OFFICE VISIT (OUTPATIENT)
Dept: PHYSICAL THERAPY | Facility: CLINIC | Age: 67
End: 2022-09-06
Payer: MEDICARE

## 2022-09-06 DIAGNOSIS — M25.562 ACUTE PAIN OF LEFT KNEE: Primary | ICD-10-CM

## 2022-09-06 PROCEDURE — 97530 THERAPEUTIC ACTIVITIES: CPT | Performed by: PHYSICAL THERAPIST

## 2022-09-06 PROCEDURE — 97110 THERAPEUTIC EXERCISES: CPT

## 2022-09-06 PROCEDURE — 97110 THERAPEUTIC EXERCISES: CPT | Performed by: PHYSICAL THERAPIST

## 2022-09-06 NOTE — PROGRESS NOTES
Daily Note     Today's date: 2022  Patient name: Mamadou Andino  :   MRN: [id number]  Referring provider: Hans Duggan*  Dx:   Encounter Diagnosis     ICD-10-CM    1  Acute pain of left knee  M25 562                   Subjective: She notes that going down stairs is getting easier  She notes that she still has pain on the lateral knee/hip  Objective: See treatment diary below      Assessment: Tolerated treatment well  Patient would benefit from continued PT  He demonstrates only mild residual distal ITB and anterior tibialis soft tissue detected with IASTM  She does demonstrate greater difficulty with SL leg press to maintain neutral knee  She does demonstrate improved descending step downs  Progressed pt to x-walks with fatigue noted and few cues to avoid compensations  Plan: Continue per plan of care        iagnosis: L acute knee pain  Precautions: N/A      Manuals 9/6  8/8 8/11 8/15 8/18 8/22 8/25 8/29 9/1   Fibular head mobs               IASTM ITB RS  TH     STM distal: TH                  Re-Evaluation         RS    Neuro Re-Ed             TRX squats 20x  10x   TRX: 10x TRX: 20x TRX:  20x increase depth nv  nv   Quad set   20x5" 10x10"         SLR    2x10 ea 2x10 ea 2x10  ea 2x10 ea       clamshells    20x ea RTB: 20x ea RTB:  20x ea GTB:  20x ea      Bridges    2x10 2x10 2" RTB:  2x10 2" RTB  2x10  2" GTB  2x10  2" SB  15x  SB   2x10   TKE   5# 20x 5# 20x 7# 20x  7# 20x      rockerboard   2' ea 2' ea 2' ea        Hip hinge SL 10x ea     15x 20x   SL  10x ea   Hamstring curls seated          10x2 ea   SLS    3x30" Foam  3x30" Foam  3x30" Foam  3x30"   Foam  3x30"   Tandem balance on foam       2x30" ea 2x30" ea     Tandem ambulation on foam: f/b        2x  3x   Stool scoots 4x at blue line        nv 4x at blue line   Heel clicks          8"F23   X-walks YTB  2x at line    NV Red: 1x Side steps RTB:3x Side steps:  RTB RTB side steps  3x GTB  Side steps  3x   Ther Ex Bike 5'   5' upright 5'  5' 5' 5' 5' 5'  5'   Heel slides   20x          Hamstring stretch   10x10" 10x10" 10x10" 10x10" 10x10" 10x10"     Leg Press     70# 2x10 75#  2x10 85#  2x10 85#  2x10 85#  2x10  90#  2x10   Leg press SL  50# 2x10     45# 15x  45# 2x10  50#  2x10   ITB stretch     10x10" ea        Seated hamstring curl        GTB  10x2     Piriformis stretch 10x10"         10x10"     Roller stick lateal thigh 1'             Ther Activity             Heel taps 4" 10x       4" 15x band assist on L pain!      Sit to stands             Step ups 6" 2x10 on L    6" 15x on L with band  6"   2x10 on L  8"  2x10 on L     Gait Training                                       Modalities

## 2022-09-08 ENCOUNTER — OFFICE VISIT (OUTPATIENT)
Dept: PHYSICAL THERAPY | Facility: CLINIC | Age: 67
End: 2022-09-08
Payer: MEDICARE

## 2022-09-08 DIAGNOSIS — M25.562 ACUTE PAIN OF LEFT KNEE: Primary | ICD-10-CM

## 2022-09-08 PROCEDURE — 97530 THERAPEUTIC ACTIVITIES: CPT | Performed by: PHYSICAL THERAPIST

## 2022-09-08 PROCEDURE — 97110 THERAPEUTIC EXERCISES: CPT | Performed by: PHYSICAL THERAPIST

## 2022-09-08 NOTE — PROGRESS NOTES
Daily Note     Today's date: 2022  Patient name: Balaij Curiel  :   MRN: [id number]  Referring provider: Ayesha Woodward*  Dx:   Encounter Diagnosis     ICD-10-CM    1  Acute pain of left knee  M25 562                   Subjective: She notes that her exercise yesterday while painting her kitchen and her knee was good up and down the ladder  Objective: See treatment diary below      Assessment: Tolerated treatment well  Patient would benefit from continued PT  She required piriformis stretch seated in between sit to stand sets  She fatigued quickly with sit to stands, only able to complete 8 repetitions  She tolerated lateral step ups well  She required verbal cues to avoid locking into extension with SL leg press this date  Plan: Continue per plan of care        iagnosis: L acute knee pain  Precautions: N/A      Manuals 9/6 9/8  8/11 8/15 8/18 8/22 8/25 8/29 9/1   Fibular head mobs               IASTM ITB RS       STM distal: TH                  Re-Evaluation         RS    Neuro Re-Ed             TRX squats 20x     TRX: 10x TRX: 20x TRX:  20x increase depth nv  nv   Quad set    10x10"         SLR     2x10 ea 2x10  ea 2x10 ea       clamshells    20x ea RTB: 20x ea RTB:  20x ea GTB:  20x ea      Bridges     2x10 2" RTB:  2x10 2" RTB  2x10  2" GTB  2x10  2" SB  15x  SB   2x10   TKE    5# 20x 7# 20x  7# 20x      rockerboard  2' ea  2' ea 2' ea        Hip hinge SL 10x ea     15x 20x   SL  10x ea   Hamstring curls seated          10x2 ea   SLS    3x30" Foam  3x30" Foam  3x30" Foam  3x30"   Foam  3x30"   Tandem balance on foam       2x30" ea 2x30" ea     Tandem ambulation on foam: f/b  3x      2x  3x   Stool scoots 4x at blue line 4x at blue blue line       nv 4x at blue line   Heel clicks          7"K65   X-walks YTB  2x at line YTB  3x RTB  NV Red: 1x Side steps RTB:3x Side steps:  RTB RTB side steps  3x GTB  Side steps  3x   Ther Ex             Bike 5'  5'   5'  5' 5' 5' 5' 5'  5'   Heel slides             Hamstring stretch    10x10" 10x10" 10x10" 10x10" 10x10"     Leg Press     70# 2x10 75#  2x10 85#  2x10 85#  2x10 85#  2x10  90#  2x10   Leg press SL  50# 2x10 50# 2x10    45# 15x  45# 2x10  50#  2x10   ITB stretch     10x10" ea        Seated hamstring curl        GTB  10x2     Piriformis stretch 10x10"  10x10"        10x10"     Roller stick lateal thigh 1'             Ther Activity             Heel taps 4" 10x       4" 15x band assist on L pain!      Sit to stands  1x8   1x5           Lateral step ups  6" 2x10           Step ups 6" 2x10 on L    6" 15x on L with band  6"   2x10 on L  8"  2x10 on L     Gait Training                                       Modalities

## 2022-09-13 ENCOUNTER — OFFICE VISIT (OUTPATIENT)
Dept: PHYSICAL THERAPY | Facility: CLINIC | Age: 67
End: 2022-09-13
Payer: MEDICARE

## 2022-09-13 DIAGNOSIS — M25.562 ACUTE PAIN OF LEFT KNEE: Primary | ICD-10-CM

## 2022-09-13 PROCEDURE — 97530 THERAPEUTIC ACTIVITIES: CPT

## 2022-09-13 PROCEDURE — 97112 NEUROMUSCULAR REEDUCATION: CPT

## 2022-09-13 PROCEDURE — 97110 THERAPEUTIC EXERCISES: CPT

## 2022-09-13 NOTE — PROGRESS NOTES
Daily Note     Today's date: 2022  Patient name: Navdeep Kline  :   MRN: [id number]  Referring provider: Amaury Okeefe*  Dx:   Encounter Diagnosis     ICD-10-CM    1  Acute pain of left knee  M25 562                   Subjective: Pt reports that she continues to do well  Walking in the sand she did feel a little tweak in her knee but nothing too bad  Objective: See treatment diary below      Assessment: Tolerated treatment well  Progressed pt with strengthening ex's as able  Resistance bands progressed and weight for leg press  No reports of increased pain noted  Cues for form needed at times with good carryover noted  Pt has minimal discomfort with forward step downs but is able to tolerate  No pain sx's after  Pt progressing slowly towards her goals and will benefit from continued therapy        Plan: Continue per plan of care     iagnosis: L acute knee pain  Precautions: N/A      Manuals 9/6 9/8 9/13  8/15 8/18 8/22 8/25 8/29 9/1   Fibular head mobs               IASTM ITB RS       STM distal: TH                  Re-Evaluation         RS    Neuro Re-Ed             TRX squats 20x  20x   TRX: 10x TRX: 20x TRX:  20x increase depth nv  nv   Quad set             SLR      2x10  ea 2x10 ea       clamshells     RTB: 20x ea RTB:  20x ea GTB:  20x ea      Bridges    SB  2x10  RTB:  2x10 2" RTB  2x10  2" GTB  2x10  2" SB  15x  SB   2x10   TKE     7# 20x  7# 20x      rockerboard  2' ea   2' ea        Hip hinge SL 10x ea  10x ea   15x 20x   SL  10x ea   Hamstring curls seated          10x2 ea   SLS     Foam  3x30" Foam  3x30" Foam  3x30"   Foam  3x30"   Tandem balance on foam       2x30" ea 2x30" ea     Tandem ambulation on foam: f/b  3x 3x      2x  3x   Stool scoots 4x at blue line 4x at blue blue line       nv 4x at blue line   Heel clicks   5"N49       3"F27   X-walks YTB  2x at line YTB  3x RTB  2x  NV Red: 1x Side steps RTB:3x Side steps:  RTB RTB side steps  3x GTB  Side steps  3x Ther Ex             Bike 5'  5'  5'  5' 5' 5' 5' 5'  5'   Heel slides             Hamstring stretch     10x10" 10x10" 10x10" 10x10"     Leg Press    100#   2x10  75#  2x10 85#  2x10 85#  2x10 85#  2x10  90#  2x10   Leg press SL  50# 2x10 50# 2x10 55#  2x10   45# 15x  45# 2x10  50#  2x10   ITB stretch     10x10" ea        Seated hamstring curl        GTB  10x2     Piriformis stretch 10x10"  10x10"        10x10"     Roller stick lateal thigh 1'             Ther Activity             Heel taps 4" 10x  4" 10x2     4" 15x band assist on L pain!      Forward heel step downs   4"  10x          Sit to stands  1x8   1x5           Lateral step ups  6" 2x10           Step ups 6" 2x10 on L      6"   2x10 on L  8"  2x10 on L     Gait Training                                       Modalities

## 2022-09-16 ENCOUNTER — OFFICE VISIT (OUTPATIENT)
Dept: PHYSICAL THERAPY | Facility: CLINIC | Age: 67
End: 2022-09-16
Payer: MEDICARE

## 2022-09-16 DIAGNOSIS — M25.562 ACUTE PAIN OF LEFT KNEE: Primary | ICD-10-CM

## 2022-09-16 PROCEDURE — 97110 THERAPEUTIC EXERCISES: CPT

## 2022-09-16 PROCEDURE — 97112 NEUROMUSCULAR REEDUCATION: CPT

## 2022-09-16 PROCEDURE — 97530 THERAPEUTIC ACTIVITIES: CPT

## 2022-09-16 NOTE — PROGRESS NOTES
Daily Note     Today's date: 2022  Patient name: Ba Arango  :   MRN: [id number]  Referring provider: Zane Son*  Dx:   Encounter Diagnosis     ICD-10-CM    1  Acute pain of left knee  M25 562        Start Time: 07  Stop Time: 745  Total time in clinic (min): 45 minutes    Subjective: Patient states she is doing well  She is able to go up and down steps now without pain  She wishes she was better than this, but she is doing better  Objective: See treatment diary below      Assessment: Continued with outlined program  Patient has good tolerance to gentle warm up  She does c/o some difficulty with stool scoots, but is able to perform and maintain tolerance  Trialed tandem foam walking without UE support with lateral sway, but no LOB  She was able to increase reps on LP with moderate muscle fatigue  Patient cued for genu valgus correction for dacia LE tap downs  She is most challenged with balance throughout program at this time  Patient is continuing to make steady progress towards goals  FOTO score reassessed; decline since last visit- reviewed with patient  Patient answered no difficulty when performing heavy activities around the house last re-eval, but at this time a little bit of difficulty  Plan: Progress treatment as tolerated         iagnosis: L acute knee pain  Precautions: N/A      Manuals    Fibular head mobs               IASTM ITB RS       STM distal: TH                  Re-Evaluation         RS    Neuro Re-Ed             TRX squats 20x  20x 20x   TRX: 10x TRX: 20x TRX:  20x increase depth nv  nv   Quad set             SLR       2x10 ea       clamshells      RTB:  20x ea GTB:  20x ea      Bridges    SB  2x10   RTB  2x10  2" GTB  2x10  2" SB  15x  SB   2x10   TKE       7# 20x      rockerboard  2' ea           Hip hinge SL 10x ea  10x ea   15x 20x   SL  10x ea   Hamstring curls seated          10x2 ea   SLS    Foam 3x30" dacia  Foam  3x30" Foam  3x30"   Foam  3x30"   Tandem balance on foam       2x30" ea 2x30" ea     Tandem ambulation on foam: f/b  3x 3x  3x    2x  3x   Stool scoots 4x at blue line 4x at blue  line  4x at blue line      nv 4x at blue line   Heel clicks   0"U61       9"A88   X-walks YTB  2x at line YTB  3x RTB  2x RTB 2x   Red: 1x Side steps RTB:3x Side steps:  RTB RTB side steps  3x GTB  Side steps  3x                Ther Ex             Bike 5'  5'  5' 5'  5' 5' 5' 5'  5'   Heel slides             Hamstring stretch      10x10" 10x10" 10x10"     Leg Press    100#   2x10 100# 3x10   85#  2x10 85#  2x10 85#  2x10  90#  2x10   Leg press SL  50# 2x10 50# 2x10 55#  2x10 55# 3x10   45# 15x  45# 2x10  50#  2x10   ITB stretch             Seated hamstring curl        GTB  10x2     Piriformis stretch 10x10"  10x10"        10x10"     Roller stick lateal thigh 1'             Ther Activity             Heel taps 4" 10x  4" 10x2 4" 2x10     4" 15x band assist on L pain!      Forward heel step downs   4"  10x 4" 2x10          Sit to stands  1x8   1x5           Lateral step ups  6" 2x10           Step ups 6" 2x10 on L      6"   2x10 on L  8"  2x10 on L     Gait Training                                       Modalities

## 2022-09-19 ENCOUNTER — APPOINTMENT (OUTPATIENT)
Dept: PHYSICAL THERAPY | Facility: CLINIC | Age: 67
End: 2022-09-19
Payer: MEDICARE

## 2022-09-20 ENCOUNTER — OFFICE VISIT (OUTPATIENT)
Dept: PHYSICAL THERAPY | Facility: CLINIC | Age: 67
End: 2022-09-20
Payer: MEDICARE

## 2022-09-20 DIAGNOSIS — M25.562 ACUTE PAIN OF LEFT KNEE: Primary | ICD-10-CM

## 2022-09-20 PROCEDURE — 97530 THERAPEUTIC ACTIVITIES: CPT

## 2022-09-20 PROCEDURE — 97112 NEUROMUSCULAR REEDUCATION: CPT

## 2022-09-20 PROCEDURE — 97110 THERAPEUTIC EXERCISES: CPT

## 2022-09-20 NOTE — PROGRESS NOTES
Daily Note     Today's date: 2022  Patient name: Ba Arango  : [id number]  MRN: [id number]  Referring provider: Zane Son*  Dx:   Encounter Diagnosis     ICD-10-CM    1  Acute pain of left knee  M25 562                   Subjective: Pt states that she is doing better, feeling stronger, she just still has a twinge here and there with stairs or if she pivots  Objective: See treatment diary below      Assessment: Tolerated treatment well  Continued with outlined program and progressed with strengthening as able  Pt challenged with weighted sit to stands and was unable to do with 10#  She was able to perform with 5# but requires cues for hip abd and is given band around her knees to maintain  Pt fatigued with 10 reps  She has minimal discomfort in her post knee with eccentric  Cues also given during tap downs laterally and forward to avoid knee valgus  Pt progressing slowly towards her goals and will benefit from continued therapy        Plan: Continue per plan of care     iagnosis: L acute knee pain  Precautions: N/A      Manuals    Fibular head mobs               IASTM ITB RS    Post knee     STM distal: TH                  Re-Evaluation         RS    Neuro Re-Ed             TRX squats 20x  20x 20x    TRX: 20x TRX:  20x increase depth nv  nv   Quad set             SLR              clamshells       GTB:  20x ea      Bridges    SB  2x10  SB  2x10  GTB  2x10  2" SB  15x  SB   2x10   TKE       7# 20x      rockerboard  2' ea           Hip hinge SL 10x ea  10x ea  10x2  ea  20x   SL  10x ea   Hamstring curls seated          10x2 ea   SLS    Foam 3x30" dacia Foam  3x30"  b/l  Foam  3x30"   Foam  3x30"   Tandem balance on foam       2x30" ea 2x30" ea     Tandem ambulation on foam: f/b  3x 3x  3x    2x  3x   Stool scoots 4x at blue line 4x at blue  line  4x at blue line      nv 4x at blue line   Heel clicks   3"Z65       7"W80   X-walks YTB  2x at line YTB  3x RTB  2x RTB 2x  NV  Side steps RTB:3x Side steps:  RTB RTB side steps  3x GTB  Side steps  3x                Ther Ex             Bike 5'  5'  5' 5' 5'  5' 5' 5'  5'   Heel slides             Hamstring stretch     10x10"  ea  10x10" 10x10"     Leg Press    100#   2x10 100# 3x10  110#  2x10  85#  2x10 85#  2x10  90#  2x10   Leg press SL  50# 2x10 50# 2x10 55#  2x10 55# 3x10  55#  3x10   45# 2x10  50#  2x10   ITB stretch             Seated hamstring curl        GTB  10x2     Piriformis stretch 10x10"  10x10"        10x10"     Roller stick lateal thigh 1'             Ther Activity             Heel taps 4" 10x  4" 10x2 4" 2x10  4"  2x10   4" 15x band assist on L pain!      Forward heel step downs   4"  10x 4" 2x10  4"  2x10        Sit to stands  1x8   1x5   5# KB  Red  10x        Lateral step ups  6" 2x10   8"  2x10        Step ups 6" 2x10 on L        8"  2x10 on L     Gait Training                                       Modalities

## 2022-09-23 ENCOUNTER — OFFICE VISIT (OUTPATIENT)
Dept: PHYSICAL THERAPY | Facility: CLINIC | Age: 67
End: 2022-09-23
Payer: MEDICARE

## 2022-09-23 DIAGNOSIS — M25.562 ACUTE PAIN OF LEFT KNEE: Primary | ICD-10-CM

## 2022-09-23 PROCEDURE — 97530 THERAPEUTIC ACTIVITIES: CPT | Performed by: PHYSICAL THERAPIST

## 2022-09-23 PROCEDURE — 97110 THERAPEUTIC EXERCISES: CPT | Performed by: PHYSICAL THERAPIST

## 2022-09-23 NOTE — PROGRESS NOTES
Daily Note     Today's date: 2022  Patient name: Kyung Dietrich  :   MRN: [id number]  Referring provider: Aly Bear*  Dx:   Encounter Diagnosis     ICD-10-CM    1  Acute pain of left knee  M25 562                   Subjective: Patient stated minimal pain prior to treatment session; states she thinks IASTM may have helped last visit  Objective: See treatment diary below      Assessment: Patient demonstrated moderate challenge with heel taps and sit to stand exercises; no c/o at completion of treatment session         Plan: Continue per plan of care     iagnosis: L acute knee pain  Precautions: N/A      Manuals    Fibular head mobs               IASTM ITB RS    Post knee  TH Post knee KK  STM distal: TH                  Re-Evaluation         RS    Neuro Re-Ed             TRX squats 20x  20x 20x    TRX: 20x TRX:  20x increase depth nv  nv   Quad set             SLR              clamshells       GTB:  20x ea      Bridges    SB  2x10  SB  2x10 SB  2x10 GTB  2x10  2" SB  15x  SB   2x10   TKE       7# 20x      rockerboard  2' ea           Hip hinge SL 10x ea  10x ea  10x2  ea  20x   SL  10x ea   Hamstring curls seated          10x2 ea   SLS    Foam 3x30" dacia Foam  3x30"  b/l  Foam  3x30"   Foam  3x30"   Tandem balance on foam       2x30" ea 2x30" ea     Tandem ambulation on foam: f/b  3x 3x  3x    2x  3x   Stool scoots 4x at blue line 4x at blue  line  4x at blue line      nv 4x at blue line   Heel clicks   1"E58       0"H13   X-walks YTB  2x at line YTB  3x RTB  2x RTB 2x  NV RTB 3x Side steps RTB:3x Side steps:  RTB RTB side steps  3x GTB  Side steps  3x                Ther Ex             Bike 5'  5'  5' 5' 5' TM 5' 5' 5' 5'  5'   Heel slides             Hamstring stretch     10x10"  ea 10x10"  ea 10x10" 10x10"     Leg Press    100#   2x10 100# 3x10  110#  2x10 110#  2x10 85#  2x10 85#  2x10  90#  2x10   Leg press SL  50# 2x10 50# 2x10 55#  2x10 55# 3x10  55#  3x10 55#  3x10  45# 2x10  50#  2x10   ITB stretch             Seated hamstring curl        GTB  10x2     Piriformis stretch 10x10"  10x10"        10x10"     Roller stick lateal thigh 1'             Ther Activity             Heel taps 4" 10x  4" 10x2 4" 2x10  4"  2x10 4"  2x10  4" 15x band assist on L pain!      Forward heel step downs   4"  10x 4" 2x10  4"  2x10 4"  2x10       Sit to stands  1x8   1x5   5# KB  Red  10x 5# KB  Red  2x10       Lateral step ups  6" 2x10   8"  2x10 8"  2x10       Step ups 6" 2x10 on L        8"  2x10 on L     Gait Training                                       Modalities

## 2022-09-26 ENCOUNTER — EVALUATION (OUTPATIENT)
Dept: PHYSICAL THERAPY | Facility: CLINIC | Age: 67
End: 2022-09-26
Payer: MEDICARE

## 2022-09-26 DIAGNOSIS — M25.562 ACUTE PAIN OF LEFT KNEE: Primary | ICD-10-CM

## 2022-09-26 PROCEDURE — 97140 MANUAL THERAPY 1/> REGIONS: CPT | Performed by: PHYSICAL THERAPIST

## 2022-09-26 PROCEDURE — 97112 NEUROMUSCULAR REEDUCATION: CPT | Performed by: PHYSICAL THERAPIST

## 2022-09-26 PROCEDURE — 97110 THERAPEUTIC EXERCISES: CPT | Performed by: PHYSICAL THERAPIST

## 2022-09-26 NOTE — PROGRESS NOTES
PT Discharge Summary    Today's date: 2022  Patient name: Alf Rosales  :   MRN: [id number]  Referring provider: Liat Irwin*  Dx:   Encounter Diagnosis     ICD-10-CM    1  Acute pain of left knee  M25 562                   Assessment  Assessment details: Alf Rosales is a pleasant 74 y o  female who presents with L knee pain resulting in difficulty with ADLs and functional activities  She demonstrates full knee AROM  She has improved deficits in her motor control and strength of hip musculature which has improved her ability to perform functional mobility such as stairs  She has met her objective and functional goals  She is appropriate for discharge to [facility] at this time  Goals  ST  Patient will be able to perform sit to stand without genu valgus in 4 weeks  - met  2  Patient will be able to perform SLR flexion without increase in symptoms in 4 weeks  - met  3  Patient will be able to perform squat to depth greater than parallel in 4 weeks  - met    LT  Patient will be able to tolerate descending stairs in 8 weeks  - met  2  Patient will demonstrate knee extension strength MMT 5/5 in 8 weeks  - met  3  Patient will be independent with home exercise program  - met  4  Patient will be able to manage symptoms independently  - met  5  Patient will be able to perform floor transfers in 8 weeks  - met with cautious    Plan  Plan details: Discharge to [facility]  Planned therapy interventions: home exercise program  Treatment plan discussed with: patient, PTA and referring physician        Subjective Evaluation    History of Present Illness  Mechanism of injury: (IE) She notes that on Wednesday she will have had pain for 3 weeks  She notes that has a habit of crossing her legs  Her two year old grandson was sitting on her foot and she was bouncing him on her foot  She had no pain in the moment, but she got tired  She notes that like two days later she had pain   The pain was in posterior to lateral knee pain  She notes that it doesn't hurt much when she walks  She notes the symptoms vary  She notes that she does get popping in the lateral knee  She notes that she feels a clicking, but denies catching/locking  She saw the doctor a week ago Monday  She notes that she was doing down steps and twisted it because she has narrow steps  She notes that the pain was so bad she could barely walk  She notes that a day or so later it popped and then the pain was fine  She notes that she has baseline pain, but she can walk okay  She denies any swelling  She denies bruising  She denies episodes of buckling, but it hurts when she steps down on it  She babysits her 3year old granammy all week  She is retired from customer service  She enjoys wineries, shopping, and she enjoys walking every day  Pain  Current pain ratin  At best pain ratin  At worst pain ratin  Location: L lateral/posterior knee  Quality: dull ache and sharp (soreness)  Relieving factors: change in position, rest, ice and medications  Aggravating factors: stair climbing  Progression: improved    Patient Goals  Patient goals for therapy: increased motion, improved balance, decreased pain, increased strength, independence with ADLs/IADLs and return to sport/leisure activities  Patient goal: to stop hurting so I can up/down the steps again-met, be able to do the laundry in the basement - met    Patient reports 80% improvement since start of PT  She notes that it really does feel fine  She notes that she is doing things  She notes that her legs are much stronger  She notes that she is just a little slower going down the stairs  She notes that she will get random intermittent discomfort, with going down steps or with walking a few months  She notes that she is doing more at home           Objective     Tenderness   Left Knee   No tenderness in the fibular head, ITB, lateral joint line, medial joint line, patellar tendon and quadriceps tendon  Right Knee   No tenderness in the fibular head, ITB, lateral joint line, medial joint line, patellar tendon and quadriceps tendon       Additional Tenderness Details  (-) TTP L anterior tibialis    *TTP with L fibular head    *findings were entered on contralateral LE in error at IE    Active Range of Motion   Left Knee   Flexion: 150 degrees   Extension: 0 degrees     Right Knee   Flexion: 150 degrees   Extension: 0 degrees     Strength/Myotome Testing     Left Hip   Planes of Motion   Flexion: 4+  Abduction: 4  External rotation: 4+    Right Hip   Planes of Motion   Flexion: 4  Abduction: 4+  External rotation: 5    Left Knee   Flexion: 5  Extension: 5    Right Knee   Normal strength  Flexion: 4-  Extension: 4+    Left Ankle/Foot   Dorsiflexion: 5  Eversion: 5    Right Ankle/Foot   Dorsiflexion: 5  Eversion: 5    Functional Assessment        Comments  No Genu valgus with sit to stand; no loss of eccentric control with sit to stands      Barbara's Most Recent Value   PT/OT G-Codes    Current Score 88   Projected Score 73   FOTO information reviewed Yes              Diagnosis: L acute knee pain  Precautions: N/A      Manuals 9/6 9/8 9/13 9/16 9/20 9/23 9/26 8/25 8/29 9/1   Fibular head mobs             IASTM ITB RS    Post knee  TH Post knee KK  STM distal: TH                  Re-Evaluation       RS  RS    Neuro Re-Ed             TRX squats 20x  20x 20x     TRX:  20x increase depth nv  nv   Quad set             SLR        Flx, abd  2x10ea      clamshells             Bridges    SB  2x10  SB  2x10 SB  2x10  SB  15x  SB   2x10   TKE             rockerboard  2' ea           Hip hinge SL 10x ea  10x ea  10x2  ea     SL  10x ea   Hamstring curls seated          10x2 ea   SLS    Foam 3x30" dacia Foam  3x30"  b/l     Foam  3x30"   Tandem balance on foam        2x30" ea     Tandem ambulation on foam: f/b  3x 3x  3x    2x  3x   Stool scoots 4x at blue line 4x at blue  line  4x at blue line      nv 4x at blue line   Heel clicks   4"Y16       2"V43   X-walks YTB  2x at line YTB  3x RTB  2x RTB 2x  NV RTB 3x GTB 2x  Side steps:  RTB RTB side steps  3x GTB  Side steps  3x                Ther Ex             Bike 5'  5'  5' 5' 5' TM 5' 5' bike 5' 5'  5'   Heel slides             Hamstring stretch     10x10"  ea 10x10"  ea  10x10"     Leg Press    100#   2x10 100# 3x10  110#  2x10 110#  2x10  85#  2x10  90#  2x10   Leg press SL  50# 2x10 50# 2x10 55#  2x10 55# 3x10  55#  3x10 55#  3x10  45# 2x10  50#  2x10   ITB stretch             Seated hamstring curl        GTB  10x2     Piriformis stretch 10x10"  10x10"        10x10"     Roller stick lateal thigh 1'             Ther Activity             Heel taps 4" 10x  4" 10x2 4" 2x10  4"  2x10 4"  2x10  4" 15x band assist on L pain!      Forward heel step downs   4"  10x 4" 2x10  4"  2x10 4"  2x10       Sit to stands  1x8   1x5   5# KB  Red  10x 5# KB  Red  2x10       Lateral step ups  6" 2x10   8"  2x10 8"  2x10       Step ups 6" 2x10 on L        8"  2x10 on L     Gait Training                                       Modalities

## 2022-09-29 ENCOUNTER — APPOINTMENT (OUTPATIENT)
Dept: PHYSICAL THERAPY | Facility: CLINIC | Age: 67
End: 2022-09-29
Payer: MEDICARE

## 2022-10-11 PROBLEM — J01.90 SUBACUTE SINUSITIS: Status: RESOLVED | Noted: 2022-06-20 | Resolved: 2022-10-11

## 2022-10-13 ENCOUNTER — RA CDI HCC (OUTPATIENT)
Dept: OTHER | Facility: HOSPITAL | Age: 67
End: 2022-10-13

## 2022-10-13 NOTE — PROGRESS NOTES
[address]  coding opportunities       Chart reviewed, no opportunity found: CHART REVIEWED, NO OPPORTUNITY FOUND        Patients Insurance     Medicare Insurance: Medicare

## 2022-10-20 ENCOUNTER — OFFICE VISIT (OUTPATIENT)
Dept: FAMILY MEDICINE CLINIC | Facility: CLINIC | Age: 67
End: 2022-10-20
Payer: MEDICARE

## 2022-10-20 VITALS
OXYGEN SATURATION: 98 % | WEIGHT: 165 LBS | HEIGHT: 69 IN | HEART RATE: 80 BPM | SYSTOLIC BLOOD PRESSURE: 112 MMHG | DIASTOLIC BLOOD PRESSURE: 80 MMHG | BODY MASS INDEX: 24.44 KG/M2 | TEMPERATURE: 96.9 F

## 2022-10-20 DIAGNOSIS — Z12.31 VISIT FOR SCREENING MAMMOGRAM: ICD-10-CM

## 2022-10-20 DIAGNOSIS — F43.10 PTSD (POST-TRAUMATIC STRESS DISORDER): ICD-10-CM

## 2022-10-20 DIAGNOSIS — F41.9 ANXIETY: ICD-10-CM

## 2022-10-20 DIAGNOSIS — Z00.00 MEDICARE ANNUAL WELLNESS VISIT, INITIAL: Primary | ICD-10-CM

## 2022-10-20 DIAGNOSIS — I34.1 MVP (MITRAL VALVE PROLAPSE): ICD-10-CM

## 2022-10-20 PROCEDURE — 99214 OFFICE O/P EST MOD 30 MIN: CPT | Performed by: FAMILY MEDICINE

## 2022-10-20 PROCEDURE — G0402 INITIAL PREVENTIVE EXAM: HCPCS | Performed by: FAMILY MEDICINE

## 2022-10-20 RX ORDER — PROPRANOLOL HYDROCHLORIDE 10 MG/1
10 TABLET ORAL
Qty: 90 TABLET | Refills: 1 | Status: SHIPPED | OUTPATIENT
Start: 2022-10-20 | End: 2023-01-18

## 2022-10-20 NOTE — PATIENT INSTRUCTIONS
Medicare Preventive Visit Patient Instructions  Thank you for completing your Welcome to Medicare Visit or Medicare Annual Wellness Visit today  Your next wellness visit will be due in one year (10/21/2023)  The screening/preventive services that you may require over the next 5-10 years are detailed below  Some tests may not apply to you based off risk factors and/or age  Screening tests ordered at today's visit but not completed yet may show as past due  Also, please note that scanned in results may not display below  Preventive Screenings:  Service Recommendations Previous Testing/Comments   Colorectal Cancer Screening  * Colonoscopy    * Fecal Occult Blood Test (FOBT)/Fecal Immunochemical Test (FIT)  * Fecal DNA/Cologuard Test  * Flexible Sigmoidoscopy Age: 39-70 years old   Colonoscopy: every 10 years (may be performed more frequently if at higher risk)  OR  FOBT/FIT: every 1 year  OR  Cologuard: every 3 years  OR  Sigmoidoscopy: every 5 years  Screening may be recommended earlier than age 39 if at higher risk for colorectal cancer  Also, an individualized decision between you and your healthcare provider will decide whether screening between the ages of 74-80 would be appropriate  Colonoscopy: 04/28/2014  FOBT/FIT: Not on file  Cologuard: Not on file  Sigmoidoscopy: Not on file          Breast Cancer Screening Age: 36 years old  Frequency: every 1-2 years  Not required if history of left and right mastectomy Mammogram: 01/13/2020        Cervical Cancer Screening Between the ages of 21-29, pap smear recommended once every 3 years  Between the ages of 33-67, can perform pap smear with HPV co-testing every 5 years     Recommendations may differ for women with a history of total hysterectomy, cervical cancer, or abnormal pap smears in past  Pap Smear: Not on file        Hepatitis C Screening Once for adults born between [facility]  More frequently in patients at high risk for Hepatitis C Hep C Antibody: Not on file        Diabetes Screening 1-2 times per year if you're at risk for diabetes or have pre-diabetes Fasting glucose: 104 mg/dL (6/15/2022)  A1C: No results in last 5 years (No results in last 5 years)      Cholesterol Screening Once every 5 years if you don't have a lipid disorder  May order more often based on risk factors  Lipid panel: 06/15/2022          Other Preventive Screenings Covered by Medicare:  1  Abdominal Aortic Aneurysm (AAA) Screening: covered once if your at risk  You're considered to be at risk if you have a family history of AAA  2  Lung Cancer Screening: covers low dose CT scan once per year if you meet all of the following conditions: (1) Age 50-69; (2) No signs or symptoms of lung cancer; (3) Current smoker or have quit smoking within the last 15 years; (4) You have a tobacco smoking history of at least 20 pack years (packs per day multiplied by number of years you smoked); (5) You get a written order from a healthcare provider  3  Glaucoma Screening: covered annually if you're considered high risk: (1) You have diabetes OR (2) Family history of glaucoma OR (3)  aged 48 and older OR (3)  American aged 72 and older  3  Osteoporosis Screening: covered every 2 years if you meet one of the following conditions: (1) You're estrogen deficient and at risk for osteoporosis based off medical history and other findings; (2) Have a vertebral abnormality; (3) On glucocorticoid therapy for more than 3 months; (4) Have primary hyperparathyroidism; (5) On osteoporosis medications and need to assess response to drug therapy  · Last bone density test (DXA Scan): Not on file  5  HIV Screening: covered annually if you're between the age of 12-76  Also covered annually if you are younger than 13 and older than 72 with risk factors for HIV infection  For pregnant patients, it is covered up to 3 times per pregnancy      Immunizations:  Immunization Recommendations   Influenza Vaccine Annual influenza vaccination during flu season is recommended for all persons aged >= 6 months who do not have contraindications   Pneumococcal Vaccine   * Pneumococcal conjugate vaccine = PCV13 (Prevnar 13), PCV15 (Vaxneuvance), PCV20 (Prevnar 20)  * Pneumococcal polysaccharide vaccine = PPSV23 (Pneumovax) Adults 25-60 years old: 1-3 doses may be recommended based on certain risk factors  Adults 72 years old: 1-2 doses may be recommended based off what pneumonia vaccine you previously received   Hepatitis B Vaccine 3 dose series if at intermediate or high risk (ex: diabetes, end stage renal disease, liver disease)   Tetanus (Td) Vaccine - COST NOT COVERED BY MEDICARE PART B Following completion of primary series, a booster dose should be given every 10 years to maintain immunity against tetanus  Td may also be given as tetanus wound prophylaxis  Tdap Vaccine - COST NOT COVERED BY MEDICARE PART B Recommended at least once for all adults  For pregnant patients, recommended with each pregnancy  Shingles Vaccine (Shingrix) - COST NOT COVERED BY MEDICARE PART B  2 shot series recommended in those aged 48 and above     Health Maintenance Due:      Topic Date Due   • Hepatitis C Screening  Never done   • HIV Screening  Never done   • Colorectal Cancer Screening  04/28/2019   • Breast Cancer Screening: Mammogram  01/13/2021     Immunizations Due:      Topic Date Due   • Pneumococcal Vaccine: 65+ Years (1 - PCV) Never done   • COVID-19 Vaccine (3 - Booster for Pfizer series) 10/04/2021   • Influenza Vaccine (1) 09/01/2022     Advance Directives   What are advance directives? Advance directives are legal documents that state your wishes and plans for medical care  These plans are made ahead of time in case you lose your ability to make decisions for yourself  Advance directives can apply to any medical decision, such as the treatments you want, and if you want to donate organs     What are the types of advance directives? There are many types of advance directives, and each state has rules about how to use them  You may choose a combination of any of the following:  · Living will: This is a written record of the treatment you want  You can also choose which treatments you do not want, which to limit, and which to stop at a certain time  This includes surgery, medicine, IV fluid, and tube feedings  · Durable power of  for healthcare [place]): This is a written record that states who you want to make healthcare choices for you when you are unable to make them for yourself  This person, called a proxy, is usually a family member or a friend  You may choose more than 1 proxy  · Do not resuscitate (DNR) order:  A DNR order is used in case your heart stops beating or you stop breathing  It is a request not to have certain forms of treatment, such as CPR  A DNR order may be included in other types of advance directives  · Medical directive: This covers the care that you want if you are in a coma, near death, or unable to make decisions for yourself  You can list the treatments you want for each condition  Treatment may include pain medicine, surgery, blood transfusions, dialysis, IV or tube feedings, and a ventilator (breathing machine)  · Values history: This document has questions about your views, beliefs, and how you feel and think about life  This information can help others choose the care that you would choose  Why are advance directives important? An advance directive helps you control your care  Although spoken wishes may be used, it is better to have your wishes written down  Spoken wishes can be misunderstood, or not followed  Treatments may be given even if you do not want them  An advance directive may make it easier for your family to make difficult choices about your care         © Copyright [employer] 2018 Information is for End User's use only and may not be sold, redistributed or otherwise used for commercial purposes   All illustrations and images included in CareNotes® are the copyrighted property of A D A M , Inc  or [facility] Barron Zambrano

## 2022-10-21 ENCOUNTER — TELEPHONE (OUTPATIENT)
Dept: FAMILY MEDICINE CLINIC | Facility: CLINIC | Age: 67
End: 2022-10-21

## 2022-10-21 ENCOUNTER — HOSPITAL ENCOUNTER (OUTPATIENT)
Dept: RADIOLOGY | Age: 67
Discharge: HOME | End: 2022-10-21
Payer: MEDICARE

## 2022-10-21 DIAGNOSIS — Z78.0 POST-MENOPAUSAL: ICD-10-CM

## 2022-10-21 PROCEDURE — 77080 DXA BONE DENSITY AXIAL: CPT

## 2022-10-21 NOTE — TELEPHONE ENCOUNTER
erendira Pabon returned your call regarding her DEXA scan  Can you please call her back and relay clinical results?  Thank you

## 2022-11-09 DIAGNOSIS — F43.10 PTSD (POST-TRAUMATIC STRESS DISORDER): ICD-10-CM

## 2022-11-09 DIAGNOSIS — F41.9 ANXIETY: ICD-10-CM

## 2022-11-09 RX ORDER — BUSPIRONE HYDROCHLORIDE 5 MG/1
5 TABLET ORAL 2 TIMES DAILY
Qty: 180 TABLET | Refills: 1 | Status: SHIPPED | OUTPATIENT
Start: 2022-11-09

## 2023-01-23 ENCOUNTER — HOSPITAL ENCOUNTER (OUTPATIENT)
Dept: MAMMOGRAPHY | Facility: HOSPITAL | Age: 68
Discharge: HOME | End: 2023-01-23
Attending: FAMILY MEDICINE

## 2023-01-23 VITALS — HEIGHT: 69 IN | BODY MASS INDEX: 23.7 KG/M2 | WEIGHT: 160 LBS

## 2023-01-23 DIAGNOSIS — Z12.31 VISIT FOR SCREENING MAMMOGRAM: ICD-10-CM

## 2023-01-24 ENCOUNTER — TELEPHONE (OUTPATIENT)
Dept: FAMILY MEDICINE CLINIC | Facility: CLINIC | Age: 68
End: 2023-01-24

## 2023-01-24 NOTE — TELEPHONE ENCOUNTER
----- Message from Huma Connell MD sent at 1/23/2023  8:31 PM EST -----  Please notify pt of normal mammogram

## 2023-07-21 ENCOUNTER — OFFICE VISIT (OUTPATIENT)
Dept: FAMILY MEDICINE CLINIC | Facility: CLINIC | Age: 68
End: 2023-07-21
Payer: MEDICARE

## 2023-07-21 VITALS
HEIGHT: 69 IN | RESPIRATION RATE: 16 BRPM | SYSTOLIC BLOOD PRESSURE: 124 MMHG | BODY MASS INDEX: 24.44 KG/M2 | OXYGEN SATURATION: 98 % | HEART RATE: 94 BPM | DIASTOLIC BLOOD PRESSURE: 78 MMHG | WEIGHT: 165 LBS

## 2023-07-21 DIAGNOSIS — E78.00 ELEVATED LDL CHOLESTEROL LEVEL: ICD-10-CM

## 2023-07-21 DIAGNOSIS — E55.9 VITAMIN D DEFICIENCY: ICD-10-CM

## 2023-07-21 DIAGNOSIS — Z90.710 HISTORY OF HYSTERECTOMY FOR BENIGN DISEASE: ICD-10-CM

## 2023-07-21 DIAGNOSIS — F43.10 PTSD (POST-TRAUMATIC STRESS DISORDER): ICD-10-CM

## 2023-07-21 DIAGNOSIS — F41.9 ANXIETY: ICD-10-CM

## 2023-07-21 DIAGNOSIS — M72.2 PLANTAR FASCIITIS OF LEFT FOOT: ICD-10-CM

## 2023-07-21 DIAGNOSIS — Z76.89 ENCOUNTER TO ESTABLISH CARE: Primary | ICD-10-CM

## 2023-07-21 DIAGNOSIS — Z12.11 COLON CANCER SCREENING: ICD-10-CM

## 2023-07-21 DIAGNOSIS — I34.1 MVP (MITRAL VALVE PROLAPSE): ICD-10-CM

## 2023-07-21 PROCEDURE — 99214 OFFICE O/P EST MOD 30 MIN: CPT | Performed by: FAMILY MEDICINE

## 2023-07-21 RX ORDER — BUSPIRONE HYDROCHLORIDE 5 MG/1
5 TABLET ORAL 2 TIMES DAILY
Qty: 180 TABLET | Refills: 0 | Status: SHIPPED | OUTPATIENT
Start: 2023-07-21

## 2023-07-21 RX ORDER — PROPRANOLOL HYDROCHLORIDE 10 MG/1
10 TABLET ORAL
Qty: 90 TABLET | Refills: 0 | Status: SHIPPED | OUTPATIENT
Start: 2023-07-21 | End: 2023-10-19

## 2023-07-21 NOTE — PROGRESS NOTES
Assessment/Plan:   Diagnoses and all orders for this visit:    Encounter to establish care  - reviewed PMHx, PSHx, meds, allergies, FHx, Soc Hx   - UTD with Tdap   - UTD with COVID IMMs but no Booster  - declined PCV20   - UTD with Breast Ca screening - 1/23/2023 - annual screening advised   - Cervical Ca screening no longer indicated   - Osteopenia noted on DXA scan done 1/21/2022 - cont Vit D 2000IU QD and Ca++ 1200mg QD - repeat in 2024   - last Cscope was in 2014 - 5yr recall per GI but per pt, spoke with her former PCP and discussed 10yr recall   - discussed diet and exercise  - due for annual screening labs - script given   - RTO in 3months, with labs, for AWV and f/u - pt aware and agreeable     Anxiety  -     propranolol (INDERAL) 10 mg tablet; Take 1 tablet (10 mg total) by mouth daily at bedtime  -     busPIRone (BUSPAR) 5 mg tablet; Take 1 tablet (5 mg total) by mouth 2 (two) times a day For anxiety  -     TSH, 3rd generation with Free T4 reflex  PTSD (post-traumatic stress disorder)  -     propranolol (INDERAL) 10 mg tablet; Take 1 tablet (10 mg total) by mouth daily at bedtime  -     busPIRone (BUSPAR) 5 mg tablet; Take 1 tablet (5 mg total) by mouth 2 (two) times a day For anxiety  - (+) MARTINE/PTSD - generally well controlled on Buspar 5mg BID   - MARTINE-7 score of 3  - per pt, relies on Xanax PRN - still has some left from her last prescription   - cont current regimen for now   Vitamin D deficiency  - cont Vit D supplements     MVP (mitral valve prolapse)  -     Comprehensive metabolic panel; Future  -     Albumin / creatinine urine ratio; Future    History of hysterectomy for benign disease  -     CBC and differential; Future    Elevated LDL cholesterol level  -     Lipid panel;  Future    Colon cancer screening  - last Cscope was in 2014 - 5yr recall per GI but per pt, spoke with her former PCP and discussed 10yr recall     Plantar fasciitis of left foot  - educational handout given re: stretching and exercises           Subjective:    Patient ID: Ashley Vivar is a 79 y.o. female. Ashley Vivar is a 79 y.o. female who presents to the office to establish care   - due for AWV in 10/2023   - prior PCP: Dr Shelby Klein   - PMHx: MVP, PTSD, MARTINE, Osteopenia, varicose veins, h/o anemia (resolved s/p hysterectomy)    - allergies: Latex/Adhesive - rash   - Meds: see med rec - ran out of meds "several weeks ago"   - PSHx: ryan, hysterectomy (CHARIS), sinus surgery   - FHx: M (Afib, HTN), F (Afib), B (HD, DM), PGM (MARTINE), MA (Breast Ca - dx in 53yo), denies FHx of Colon Ca   - Immunizations: UTD with COVID IMMs but no Booster, UTD with Tdap (2018)   - GYN Hx: Mammo 1/23/2023   - Hm: DXA in 10/21/2022 (2yr f/u), last Cscope was in 2014 - 5yr recall per GI but per pt, spoke with her former PCP and discussed 10yr recall   - social: denies tob, social EtOH  - (+) MARTINE/PTSD - generally well controlled on Buspar 5mg BID   - MARTINE-7 score of 3  - (+) MVP - ran out of BB a few weeks ago   - ROS: today in the office pt denies F/C/N/V/HA/visual changes/palpitations/SOB/wheezing/abd pain/D/LE edema      The following portions of the patient's history were reviewed and updated as appropriate: allergies, current medications, past family history, past medical history, past social history, past surgical history and problem list.    Review of Systems  as per HPI    Objective:  /78 (BP Location: Right arm, Patient Position: Sitting, Cuff Size: Standard)   Pulse 94   Resp 16   Ht 5' 8.5" (1.74 m)   Wt 74.8 kg (165 lb)   SpO2 98%   BMI 24.72 kg/m²    Physical Exam  Vitals reviewed. Constitutional:       General: She is not in acute distress. Appearance: Normal appearance. She is not ill-appearing, toxic-appearing or diaphoretic. HENT:      Head: Normocephalic and atraumatic. Right Ear: External ear normal.      Left Ear: External ear normal.      Nose: Nose normal.   Eyes:      General: No scleral icterus.         Right eye: No discharge. Left eye: No discharge. Conjunctiva/sclera: Conjunctivae normal.   Cardiovascular:      Rate and Rhythm: Normal rate and regular rhythm. Heart sounds: Normal heart sounds. Pulmonary:      Effort: Pulmonary effort is normal. No respiratory distress. Breath sounds: Normal breath sounds. No stridor. No wheezing, rhonchi or rales. Abdominal:      Palpations: Abdomen is soft. Musculoskeletal:         General: Normal range of motion. Cervical back: Normal range of motion. Right lower leg: No edema. Left lower leg: No edema. Comments: Tightness at base of R-foot    Skin:     General: Skin is warm. Neurological:      General: No focal deficit present. Mental Status: She is alert and oriented to person, place, and time. Psychiatric:         Mood and Affect: Mood normal.         Behavior: Behavior normal.         Depression Screening Follow-up Plan: Patient's depression screening was positive with a PHQ-2 score of 0. Their PHQ-9 score was . Patient assessed for underlying major depression. They have no active suicidal ideations. Brief counseling provided and recommend additional follow-up/re-evaluation next office visit. Patient advised to follow-up with PCP for further management.

## 2023-07-21 NOTE — PATIENT INSTRUCTIONS
Plantar Fasciitis Exercises   WHAT YOU NEED TO KNOW:   What do I need to know about plantar fasciitis exercises? Plantar fasciitis exercises help stretch your plantar fascia, calf muscles, and Achilles tendon. They also help strengthen the muscles that support your heel and foot. Exercises and stretching can help prevent plantar fasciitis from getting worse or coming back. How do I do plantar fasciitis exercises? Ask your healthcare provider when to start these exercises and how often to do them. Slant board stretch:  Stand on a slanted board with your toes higher than your heel. Press your heel into the board. Keep your knee slightly bent. Hold this position for 1 minute. Repeat 5 times. Heel stretch:  Stand up straight with your hands on a wall. Place your injured leg slightly behind your other leg. Keep your heels flat on the floor, lean forward, and bend both knees. Hold for 30 seconds. Calf stretch:  Stand up straight with your hands on a wall. Step forward so that your uninjured foot is in front of your injured foot. Keep your front leg bent and your back leg straight. Gently lean forward until you feel your calf stretch. Hold for 30 seconds and then relax. Seated plantar fascia stretch:  Sit on a firm surface, such as the floor or a mat. Extend your legs out in front of you. Raise your injured foot a few inches off the ground. Keep your leg straight. Grab the toes of your injured foot and pull them toward you. With your other hand, feel your plantar fascia. You should feel it press outward. Hold for 30 seconds. If you cannot reach your toes, loop a towel or tie around your foot. Gently pull on the towel or tie and flex your toes toward you. Heel raises:  Stand on the injured leg. Raise your other leg off the ground. Hold onto a railing or wall for balance. Slowly rise up on the toes of your injured leg. Hold for 5 seconds. Slowly lower your heel to the ground. Toe curls:  Place a towel on the floor. Put your foot flat on the towel. Grab the towel with your toes by curling them around the towel. Lift the towel up with your toes. Toe taps:  Sit down and place your foot flat on the floor. Keep your heel on the floor. Point all your toes up toward the ceiling. While the 4 smaller toes are pointed up, bend your big toe down and tap it on the ground. Do 10 to 50 taps. Point all 5 toes up toward the ceiling again. This time keep your big toe pointed up and tap the 4 smaller toes on the ground. Do 10 to 50 taps each time. When should I call my doctor or physical therapist?   Your pain and swelling increase. You develop new knee, hip, or back pain. You have questions or concerns about your condition or care. CARE AGREEMENT:   You have the right to help plan your care. Learn about your health condition and how it may be treated. Discuss treatment options with your healthcare providers to decide what care you want to receive. You always have the right to refuse treatment. The above information is an  only. It is not intended as medical advice for individual conditions or treatments. Talk to your doctor, nurse or pharmacist before following any medical regimen to see if it is safe and effective for you. © Copyright Vidya Gaines 2022 Information is for End User's use only and may not be sold, redistributed or otherwise used for commercial purposes.

## 2023-10-15 DIAGNOSIS — F43.10 PTSD (POST-TRAUMATIC STRESS DISORDER): ICD-10-CM

## 2023-10-15 DIAGNOSIS — F41.9 ANXIETY: ICD-10-CM

## 2023-10-16 RX ORDER — PROPRANOLOL HYDROCHLORIDE 10 MG/1
10 TABLET ORAL
Qty: 90 TABLET | Refills: 1 | Status: SHIPPED | OUTPATIENT
Start: 2023-10-16

## 2023-10-18 ENCOUNTER — TELEPHONE (OUTPATIENT)
Dept: OTHER | Facility: HOSPITAL | Age: 68
End: 2023-10-18

## 2023-10-18 NOTE — TELEPHONE ENCOUNTER
Patient called stating she received a message about an appointment she scheduled with Dr. Royal Barreto, she was told that appointment needed to be rescheduled with Dr Dominick Berry. Patient no longer wants to see Dr. Dominick Berry, that is why she made the appointment with Dr. Royal Barreto.

## 2023-10-18 NOTE — TELEPHONE ENCOUNTER
Left message for Lawson Kruger to call the office back in regards to her appt with Dr. Maria Luisa Alatorre on 10/27.

## 2023-10-19 RX ORDER — BUSPIRONE HYDROCHLORIDE 5 MG/1
TABLET ORAL
Qty: 30 TABLET | Refills: 0 | Status: SHIPPED | OUTPATIENT
Start: 2023-10-19

## 2023-11-14 ENCOUNTER — TELEPHONE (OUTPATIENT)
Age: 68
End: 2023-11-14

## 2023-11-21 RX ORDER — AMOXICILLIN 500 MG/1
CAPSULE ORAL
COMMUNITY
Start: 2023-10-25 | End: 2023-11-22

## 2023-11-22 ENCOUNTER — OFFICE VISIT (OUTPATIENT)
Dept: FAMILY MEDICINE CLINIC | Facility: CLINIC | Age: 68
End: 2023-11-22
Payer: MEDICARE

## 2023-11-22 VITALS
RESPIRATION RATE: 18 BRPM | WEIGHT: 167 LBS | DIASTOLIC BLOOD PRESSURE: 60 MMHG | BODY MASS INDEX: 24.73 KG/M2 | SYSTOLIC BLOOD PRESSURE: 122 MMHG | HEART RATE: 83 BPM | OXYGEN SATURATION: 100 % | HEIGHT: 69 IN

## 2023-11-22 DIAGNOSIS — Z00.00 MEDICARE ANNUAL WELLNESS VISIT, INITIAL: Primary | ICD-10-CM

## 2023-11-22 DIAGNOSIS — F41.9 ANXIETY: ICD-10-CM

## 2023-11-22 DIAGNOSIS — Z12.31 ENCOUNTER FOR SCREENING MAMMOGRAM FOR BREAST CANCER: ICD-10-CM

## 2023-11-22 DIAGNOSIS — Z12.11 SCREENING FOR COLON CANCER: ICD-10-CM

## 2023-11-22 DIAGNOSIS — I34.1 MVP (MITRAL VALVE PROLAPSE): ICD-10-CM

## 2023-11-22 PROBLEM — F43.10 PTSD (POST-TRAUMATIC STRESS DISORDER): Status: RESOLVED | Noted: 2022-05-05 | Resolved: 2023-11-22

## 2023-11-22 PROBLEM — R19.8 CHANGE IN BOWEL MOVEMENT: Status: RESOLVED | Noted: 2020-02-14 | Resolved: 2023-11-22

## 2023-11-22 PROBLEM — R10.31 CHRONIC RLQ PAIN: Status: RESOLVED | Noted: 2020-02-14 | Resolved: 2023-11-22

## 2023-11-22 PROBLEM — G89.29 CHRONIC RLQ PAIN: Status: RESOLVED | Noted: 2020-02-14 | Resolved: 2023-11-22

## 2023-11-22 PROCEDURE — 99213 OFFICE O/P EST LOW 20 MIN: CPT | Performed by: NURSE PRACTITIONER

## 2023-11-22 PROCEDURE — G0439 PPPS, SUBSEQ VISIT: HCPCS | Performed by: NURSE PRACTITIONER

## 2023-11-22 RX ORDER — ALPRAZOLAM 0.25 MG/1
0.25 TABLET ORAL
Qty: 30 TABLET | Refills: 0 | Status: SHIPPED | OUTPATIENT
Start: 2023-11-22

## 2023-11-22 RX ORDER — PROPRANOLOL HYDROCHLORIDE 10 MG/1
10 TABLET ORAL
Qty: 90 TABLET | Refills: 1 | Status: SHIPPED | OUTPATIENT
Start: 2023-11-22 | End: 2023-11-22 | Stop reason: SDUPTHER

## 2023-11-22 RX ORDER — PROPRANOLOL HYDROCHLORIDE 10 MG/1
10 TABLET ORAL
Qty: 90 TABLET | Refills: 1 | Status: SHIPPED | OUTPATIENT
Start: 2023-11-22

## 2023-11-22 RX ORDER — BUSPIRONE HYDROCHLORIDE 5 MG/1
5 TABLET ORAL 2 TIMES DAILY
Qty: 180 TABLET | Refills: 1 | Status: SHIPPED | OUTPATIENT
Start: 2023-11-22 | End: 2023-11-22 | Stop reason: SDUPTHER

## 2023-11-22 RX ORDER — BUSPIRONE HYDROCHLORIDE 5 MG/1
5 TABLET ORAL 2 TIMES DAILY
Qty: 180 TABLET | Refills: 1 | Status: SHIPPED | OUTPATIENT
Start: 2023-11-22

## 2023-11-22 NOTE — PROGRESS NOTES
Assessment and Plan:     Problem List Items Addressed This Visit          Cardiovascular and Mediastinum    MVP (mitral valve prolapse)    Denies any chest pain or SOB. Denies any palpitations. Other    Anxiety - Primary    Relevant Medications    ALPRAZolam (XANAX) 0.25 mg tablet    busPIRone (BUSPAR) 5 mg tablet    propranolol (INDERAL) 10 mg tablet    Patient reports that she takes buspar and propranolol as prescribed and it helps. Denies any depression or suicidal thoughts. Patient rarely takes xanax prn. PDMP checked. Refilled xanax. Continue buspar and propranolol as prescribed. Screening for colon cancer    Relevant Orders    Cologuard    Encounter for screening mammogram for breast cancer    Relevant Orders    Mammo screening bilateral w 3d & cad    Medicare annual wellness visit  Patient instructed to eat a healthy well balanced diet. Patient instructed to get her lab work done. Will follow-up results with the patient. Patient instructed to follow-up in 3 months or sooner prn. BMI Counseling: Body mass index is 25.02 kg/m². The BMI is above normal. Nutrition recommendations include encouraging healthy choices of fruits and vegetables, decreasing fast food intake, consuming healthier snacks, limiting drinks that contain sugar, reducing intake of saturated and trans fat and reducing intake of cholesterol. Exercise recommendations include exercising 3-5 times per week. Rationale for BMI follow-up plan is due to patient being overweight or obese. Depression Screening and Follow-up Plan: Patient was screened for depression during today's encounter. They screened negative with a PHQ-2 score of 0. Preventive health issues were discussed with patient, and age appropriate screening tests were ordered as noted in patient's After Visit Summary.   Personalized health advice and appropriate referrals for health education or preventive services given if needed, as noted in patient's After Visit Summary. History of Present Illness:     Patient presents for a Medicare Wellness Visit    Patient is here to establish care with me. Patient takes buspar 5mg BID and propanolol 10mg at bedtime for anxiety. Patient reports that the medications help. Denies any depression or suicidal thoughts. Patient reports that she rarely takes xanax prn for the anxiety and it helps. Patient reports that she does have mitral valve prolapse. Denies any chest pain, SOB, or palpitations. Patient Care Team:  ZEKE Rhodes as PCP - General (Family Medicine)  Michaele Majors, MD Vivi Opitz, MD (Gastroenterology)     Review of Systems:     Review of Systems   Constitutional:  Negative for chills, fatigue and fever. HENT:  Negative for congestion, ear pain, sinus pressure, sore throat and trouble swallowing. Eyes:  Negative for pain, discharge and redness. Respiratory:  Negative for cough, chest tightness, shortness of breath and wheezing. Cardiovascular:  Negative for chest pain, palpitations and leg swelling. Gastrointestinal:  Negative for abdominal pain, blood in stool, diarrhea, nausea and vomiting. Genitourinary:  Negative for dysuria, frequency, hematuria and urgency. Musculoskeletal:  Negative for arthralgias and myalgias. Skin:  Negative for rash. Neurological:  Negative for dizziness, syncope, light-headedness and headaches. Psychiatric/Behavioral:  Negative for suicidal ideas. As noted in HPI.          Problem List:     Patient Active Problem List   Diagnosis    Need for Tdap vaccination    Anxiety    Screening for cardiovascular condition    MVP (mitral valve prolapse)    Ganglion cyst of dorsum of right wrist    Varicose veins of both lower extremities    History of hysterectomy for benign disease    Screening for colon cancer    Encounter for screening mammogram for breast cancer      Past Medical and Surgical History:     Past Medical History:   Diagnosis Date    Anxiety     Change in bowel movement 02/14/2020    Chronic RLQ pain 02/14/2020    Mitral prolapse     PTSD (post-traumatic stress disorder) 05/05/2022     Past Surgical History:   Procedure Laterality Date    CHOLECYSTECTOMY      HYSTERECTOMY      SINUS SURGERY        Family History:     Family History   Problem Relation Age of Onset    Atrial fibrillation Mother     Hypertension Mother     Atrial fibrillation Father     Heart disease Brother     Diabetes Brother     No Known Problems Daughter     No Known Problems Daughter     Anxiety disorder Paternal Grandmother     Breast cancer Maternal Aunt         in her 52;s    Colon cancer Neg Hx       Social History:     Social History     Socioeconomic History    Marital status:      Spouse name: None    Number of children: None    Years of education: None    Highest education level: None   Occupational History    None   Tobacco Use    Smoking status: Never    Smokeless tobacco: Never   Vaping Use    Vaping Use: Never used   Substance and Sexual Activity    Alcohol use: Yes     Comment: socially    Drug use: No    Sexual activity: None   Other Topics Concern    None   Social History Narrative    None     Social Determinants of Health     Financial Resource Strain: Low Risk  (11/22/2023)    Overall Financial Resource Strain (CARDIA)     Difficulty of Paying Living Expenses: Not hard at all   Food Insecurity: Not on file   Transportation Needs: No Transportation Needs (11/22/2023)    PRAPARE - Transportation     Lack of Transportation (Medical): No     Lack of Transportation (Non-Medical):  No   Physical Activity: Not on file   Stress: Not on file   Social Connections: Not on file   Intimate Partner Violence: Not on file   Housing Stability: Not on file      Medications and Allergies:     Current Outpatient Medications   Medication Sig Dispense Refill    ALPRAZolam (XANAX) 0.25 mg tablet Take 1 tablet (0.25 mg total) by mouth daily at bedtime as needed for anxiety 30 tablet 0    busPIRone (BUSPAR) 5 mg tablet Take 1 tablet (5 mg total) by mouth 2 (two) times a day 180 tablet 1    propranolol (INDERAL) 10 mg tablet Take 1 tablet (10 mg total) by mouth daily at bedtime 90 tablet 1     No current facility-administered medications for this visit. Allergies   Allergen Reactions    Latex Rash    Wound Dressing Adhesive Rash      Immunizations:     Immunization History   Administered Date(s) Administered    COVID-19 PFIZER VACCINE 0.3 ML IM 04/13/2021, 05/04/2021    INFLUENZA 10/19/2020    Tdap 11/28/2018      Health Maintenance:         Topic Date Due    Colorectal Cancer Screening  04/28/2019    Breast Cancer Screening: Mammogram  01/23/2024    Hepatitis C Screening  07/21/2024 (Originally 1955)         Topic Date Due    COVID-19 Vaccine (3 - Pfizer series) 06/29/2021    Pneumococcal Vaccine: 65+ Years (1 - PCV) Never done      Medicare Screening Tests and Risk Assessments:     Sherrie Sepulveda is here for her Initial Wellness visit. Health Risk Assessment:   Patient rates overall health as very good. Patient feels that their physical health rating is same. Patient is satisfied with their life. Eyesight was rated as slightly worse. Hearing was rated as same. Patient feels that their emotional and mental health rating is same. Patients states they are never, rarely angry. Patient states they are never, rarely unusually tired/fatigued. Pain experienced in the last 7 days has been none. Patient states that she has experienced no weight loss or gain in last 6 months. Depression Screening:   PHQ-2 Score: 0      Fall Risk Screening: In the past year, patient has experienced: no history of falling in past year      Urinary Incontinence Screening:   Patient has not leaked urine accidently in the last six months. Home Safety:  Patient does not have trouble with stairs inside or outside of their home.  Patient has working smoke alarms and has working carbon monoxide detector. Home safety hazards include: none. Nutrition:   Current diet is Regular. Medications:   Patient is currently taking over-the-counter supplements. OTC medications include: see medication list. Patient is able to manage medications. Activities of Daily Living (ADLs)/Instrumental Activities of Daily Living (IADLs):   Walk and transfer into and out of bed and chair?: Yes  Dress and groom yourself?: Yes    Bathe or shower yourself?: Yes    Feed yourself? Yes  Do your laundry/housekeeping?: Yes  Manage your money, pay your bills and track your expenses?: Yes  Make your own meals?: Yes    Do your own shopping?: Yes    Previous Hospitalizations:   Any hospitalizations or ED visits within the last 12 months?: No      Advance Care Planning:   Living will: Yes    Durable POA for healthcare: Yes    Advanced directive: Yes      Cognitive Screening:   Provider or family/friend/caregiver concerned regarding cognition?: No    PREVENTIVE SCREENINGS      Cardiovascular Screening:    General: Screening Current      Diabetes Screening:     General: Risks and Benefits Discussed    Due for: Blood Glucose      Colorectal Cancer Screening:     General: Risks and Benefits Discussed    Due for: Cologuard      Breast Cancer Screening:     General: Screening Current      Cervical Cancer Screening:    General: Screening Not Indicated      Osteoporosis Screening:    General: Screening Current      Abdominal Aortic Aneurysm (AAA) Screening:        General: Screening Not Indicated      Lung Cancer Screening:     General: Screening Not Indicated      Hepatitis C Screening:    General: Risks and Benefits Discussed and Patient Declines    Screening, Brief Intervention, and Referral to Treatment (SBIRT)    Screening  Typical number of drinks in a day: 1  Typical number of drinks in a week: 7  Interpretation: Low risk drinking behavior.     AUDIT-C Screenin) How often did you have a drink containing alcohol in the past year? 4 or more times a week  2) How many drinks did you have on a typical day when you were drinking in the past year? 1 to 2  3) How often did you have 6 or more drinks on one occasion in the past year? never    AUDIT-C Score: 4  Interpretation: Score 3-12 (female): POSITIVE screen for alcohol misuse    AUDIT Screenin) How often during the last year have you found that you were not able to stop drinking once you had started? 0 - never  5) How often during the last year have you failed to do what was normally expected from you because of drinking? 0 - never  6) How often during the last year have you needed a first drink in the morning to get yourself going after a heavy drinking session? 0 - never  7) How often during the last year have you had a feeling of guilt or remorse after drinking? 0 - never  8) How often during the last year have you been unable to remember what happened the night before because you had been drinking? 0 - never  9) Have you or someone else been injured as a result of your drinking? 0 - no  10) Has a relative or friend or a doctor or another health worker been concerned about your drinking or suggested you cut down? 0 - no    AUDIT Score: 4  Interpretation: Low risk alcohol consumption    Single Item Drug Screening:  How often have you used an illegal drug (including marijuana) or a prescription medication for non-medical reasons in the past year? never    Single Item Drug Screen Score: 0  Interpretation: Negative screen for possible drug use disorder    Brief Intervention  Alcohol & drug use screenings were reviewed. No concerns regarding substance use disorder identified. Other Counseling Topics:   Car/seat belt/driving safety, skin self-exam, sunscreen and calcium and vitamin D intake and regular weightbearing exercise. No results found.      Physical Exam:     /60   Pulse 83   Resp 18   Ht 5' 8.5" (1.74 m)   Wt 75.8 kg (167 lb)   SpO2 100%   BMI 25.02 kg/m²     Physical Exam  Vitals reviewed. Constitutional:       General: She is not in acute distress. Appearance: Normal appearance. She is not ill-appearing or diaphoretic. HENT:      Right Ear: Tympanic membrane, ear canal and external ear normal.      Left Ear: Tympanic membrane, ear canal and external ear normal.      Nose: Nose normal.      Mouth/Throat:      Mouth: Mucous membranes are moist.      Pharynx: Oropharynx is clear. No oropharyngeal exudate or posterior oropharyngeal erythema. Eyes:      Conjunctiva/sclera: Conjunctivae normal.      Pupils: Pupils are equal, round, and reactive to light. Cardiovascular:      Rate and Rhythm: Normal rate and regular rhythm. Pulses: Normal pulses. Comments: No edema noted. Pulmonary:      Effort: Pulmonary effort is normal. No respiratory distress. Breath sounds: Normal breath sounds. No wheezing. Musculoskeletal:      Comments: Gait wnl. Skin:     Findings: No rash. Neurological:      Mental Status: She is alert and oriented to person, place, and time.    Psychiatric:         Mood and Affect: Mood normal.          ZEKE Rhodes

## 2023-11-22 NOTE — PROGRESS NOTES
Name: Kristyn Michelle      :       MRN: [id number]  Encounter Provider: ZEKE Owen  Encounter Date: 2023   Encounter department: [address] [place] [address]     1. PTSD (post-traumatic stress disorder)    2.  Anxiety           Subjective      HPI  Review of Systems    Current Outpatient Medications on File Prior to Visit   Medication Sig    ALPRAZolam (XANAX) 0.25 mg tablet Take 1 tablet (0.25 mg total) by mouth daily at bedtime as needed for anxiety    busPIRone (BUSPAR) 5 mg tablet TAKE 1 TABLET BY MOUTH TWICE A DAY FOR ANXIETY    propranolol (INDERAL) 10 mg tablet TAKE 1 TABLET BY MOUTH DAILY AT BEDTIME    [DISCONTINUED] amoxicillin (AMOXIL) 500 mg capsule TAKE FOUR CAPSULES ONE HOUR PRIOR TO APT (Patient not taking: Reported on 2023)       Objective     /60   Pulse 83   Resp 18   Ht 5' 8.5" (1.74 m)   Wt 75.8 kg (167 lb)   SpO2 100%   BMI 25.02 kg/m²     Physical Exam  ZEKE Owen

## 2023-11-22 NOTE — PATIENT INSTRUCTIONS
Medicare Preventive Visit Patient Instructions  Thank you for completing your Welcome to Medicare Visit or Medicare Annual Wellness Visit today. Your next wellness visit will be due in one year (11/22/2024). The screening/preventive services that you may require over the next 5-10 years are detailed below. Some tests may not apply to you based off risk factors and/or age. Screening tests ordered at today's visit but not completed yet may show as past due. Also, please note that scanned in results may not display below. Preventive Screenings:  Service Recommendations Previous Testing/Comments   Colorectal Cancer Screening  * Colonoscopy    * Fecal Occult Blood Test (FOBT)/Fecal Immunochemical Test (FIT)  * Fecal DNA/Cologuard Test  * Flexible Sigmoidoscopy Age: 43-73 years old   Colonoscopy: every 10 years (may be performed more frequently if at higher risk)  OR  FOBT/FIT: every 1 year  OR  Cologuard: every 3 years  OR  Sigmoidoscopy: every 5 years  Screening may be recommended earlier than age 39 if at higher risk for colorectal cancer. Also, an individualized decision between you and your healthcare provider will decide whether screening between the ages of 77-80 would be appropriate. Colonoscopy: 04/28/2014  FOBT/FIT: Not on file  Cologuard: Not on file  Sigmoidoscopy: Not on file          Breast Cancer Screening Age: 36 years old  Frequency: every 1-2 years  Not required if history of left and right mastectomy Mammogram: 01/23/2023        Cervical Cancer Screening Between the ages of 21-29, pap smear recommended once every 3 years. Between the ages of 32-69, can perform pap smear with HPV co-testing every 5 years.    Recommendations may differ for women with a history of total hysterectomy, cervical cancer, or abnormal pap smears in past. Pap Smear: Not on file        Hepatitis C Screening Once for adults born between [address] [place] [address]  More frequently in patients at high risk for Hepatitis C Hep C Antibody: Not on file        Diabetes Screening 1-2 times per year if you're at risk for diabetes or have pre-diabetes Fasting glucose: 104 mg/dL (6/15/2022)  A1C: No results in last 5 years (No results in last 5 years)      Cholesterol Screening Once every 5 years if you don't have a lipid disorder. May order more often based on risk factors. Lipid panel: 06/15/2022          Other Preventive Screenings Covered by Medicare:  Abdominal Aortic Aneurysm (AAA) Screening: covered once if your at risk. You're considered to be at risk if you have a family history of AAA. Lung Cancer Screening: covers low dose CT scan once per year if you meet all of the following conditions: (1) Age 48-67; (2) No signs or symptoms of lung cancer; (3) Current smoker or have quit smoking within the last 15 years; (4) You have a tobacco smoking history of at least 20 pack years (packs per day multiplied by number of years you smoked); (5) You get a written order from a healthcare provider. Glaucoma Screening: covered annually if you're considered high risk: (1) You have diabetes OR (2) Family history of glaucoma OR (3)  aged 48 and older OR (3)  American aged 72 and older  Osteoporosis Screening: covered every 2 years if you meet one of the following conditions: (1) You're estrogen deficient and at risk for osteoporosis based off medical history and other findings; (2) Have a vertebral abnormality; (3) On glucocorticoid therapy for more than 3 months; (4) Have primary hyperparathyroidism; (5) On osteoporosis medications and need to assess response to drug therapy. Last bone density test (DXA Scan): 10/21/2022. HIV Screening: covered annually if you're between the age of 14-79. Also covered annually if you are younger than 13 and older than 72 with risk factors for HIV infection. For pregnant patients, it is covered up to 3 times per pregnancy.     Immunizations:  Immunization Recommendations   Influenza Vaccine Annual influenza vaccination during flu season is recommended for all persons aged >= 6 months who do not have contraindications   Pneumococcal Vaccine   * Pneumococcal conjugate vaccine = PCV13 (Prevnar 13), PCV15 (Vaxneuvance), PCV20 (Prevnar 20)  * Pneumococcal polysaccharide vaccine = PPSV23 (Pneumovax) Adults  yo with certain risk factors or if 69+ yo  If never received any pneumonia vaccine: recommend Prevnar 20 (PCV20)  Give PCV20 if previously received 1 dose of PCV13 or PPSV23   Hepatitis B Vaccine 3 dose series if at intermediate or high risk (ex: diabetes, end stage renal disease, liver disease)   Respiratory syncytial virus (RSV) Vaccine - COVERED BY MEDICARE PART D  * RSVPreF3 (Arexvy) CDC recommends that adults 61years of age and older may receive a single dose of RSV vaccine using shared clinical decision-making (SCDM)   Tetanus (Td) Vaccine - COST NOT COVERED BY MEDICARE PART B Following completion of primary series, a booster dose should be given every 10 years to maintain immunity against tetanus. Td may also be given as tetanus wound prophylaxis. Tdap Vaccine - COST NOT COVERED BY MEDICARE PART B Recommended at least once for all adults. For pregnant patients, recommended with each pregnancy. Shingles Vaccine (Shingrix) - COST NOT COVERED BY MEDICARE PART B  2 shot series recommended in those 19 years and older who have or will have weakened immune systems or those 50 years and older     Health Maintenance Due:      Topic Date Due   • Colorectal Cancer Screening  04/28/2019   • Breast Cancer Screening: Mammogram  01/23/2024   • Hepatitis C Screening  07/21/2024 (Originally 1955)     Immunizations Due:      Topic Date Due   • COVID-19 Vaccine (3 - Pfizer series) 06/29/2021   • Pneumococcal Vaccine: 65+ Years (1 - PCV) Never done     Advance Directives   What are advance directives? Advance directives are legal documents that state your wishes and plans for medical care.  These plans are made ahead of time in case you lose your ability to make decisions for yourself. Advance directives can apply to any medical decision, such as the treatments you want, and if you want to donate organs. What are the types of advance directives? There are many types of advance directives, and each state has rules about how to use them. You may choose a combination of any of the following:  Living will: This is a written record of the treatment you want. You can also choose which treatments you do not want, which to limit, and which to stop at a certain time. This includes surgery, medicine, IV fluid, and tube feedings. Durable power of  for healthcare [facility]): This is a written record that states who you want to make healthcare choices for you when you are unable to make them for yourself. This person, called a proxy, is usually a family member or a friend. You may choose more than 1 proxy. Do not resuscitate (DNR) order:  A DNR order is used in case your heart stops beating or you stop breathing. It is a request not to have certain forms of treatment, such as CPR. A DNR order may be included in other types of advance directives. Medical directive: This covers the care that you want if you are in a coma, near death, or unable to make decisions for yourself. You can list the treatments you want for each condition. Treatment may include pain medicine, surgery, blood transfusions, dialysis, IV or tube feedings, and a ventilator (breathing machine). Values history: This document has questions about your views, beliefs, and how you feel and think about life. This information can help others choose the care that you would choose. Why are advance directives important? An advance directive helps you control your care. Although spoken wishes may be used, it is better to have your wishes written down. Spoken wishes can be misunderstood, or not followed. Treatments may be given even if you do not want them.  An advance directive may make it easier for your family to make difficult choices about your care. Weight Management   Why it is important to manage your weight:  Being overweight increases your risk of health conditions such as heart disease, high blood pressure, type 2 diabetes, and certain types of cancer. It can also increase your risk for osteoarthritis, sleep apnea, and other respiratory problems. Aim for a slow, steady weight loss. Even a small amount of weight loss can lower your risk of health problems. How to lose weight safely:  A safe and healthy way to lose weight is to eat fewer calories and get regular exercise. You can lose up about 1 pound a week by decreasing the number of calories you eat by 500 calories each day. Healthy meal plan for weight management:  A healthy meal plan includes a variety of foods, contains fewer calories, and helps you stay healthy. A healthy meal plan includes the following:  Eat whole-grain foods more often. A healthy meal plan should contain fiber. Fiber is the part of grains, fruits, and vegetables that is not broken down by your body. Whole-grain foods are healthy and provide extra fiber in your diet. Some examples of whole-grain foods are whole-wheat breads and pastas, oatmeal, brown rice, and bulgur. Eat a variety of vegetables every day. Include dark, leafy greens such as spinach, kale, nay greens, and mustard greens. Eat yellow and orange vegetables such as carrots, sweet potatoes, and winter squash. Eat a variety of fruits every day. Choose fresh or canned fruit (canned in its own juice or light syrup) instead of juice. Fruit juice has very little or no fiber. Eat low-fat dairy foods. Drink fat-free (skim) milk or 1% milk. Eat fat-free yogurt and low-fat cottage cheese. Try low-fat cheeses such as mozzarella and other reduced-fat cheeses. Choose meat and other protein foods that are low in fat. Choose beans or other legumes such as split peas or lentils. Choose fish, skinless poultry (chicken or turkey), or lean cuts of red meat (beef or pork). Before you cook meat or poultry, cut off any visible fat. Use less fat and oil. Try baking foods instead of frying them. Add less fat, such as margarine, sour cream, regular salad dressing and mayonnaise to foods. Eat fewer high-fat foods. Some examples of high-fat foods include french fries, doughnuts, ice cream, and cakes. Eat fewer sweets. Limit foods and drinks that are high in sugar. This includes candy, cookies, regular soda, and sweetened drinks. Exercise:  Exercise at least 30 minutes per day on most days of the week. Some examples of exercise include walking, biking, dancing, and swimming. You can also fit in more physical activity by taking the stairs instead of the elevator or parking farther away from stores. Ask your healthcare provider about the best exercise plan for you. © Copyright [employer] 2018 Information is for End User's use only and may not be sold, redistributed or otherwise used for commercial purposes.  All illustrations and images included in CareNotes® are the copyrighted property of A.D.A.M., Inc. or  [employer]

## 2024-01-21 PROBLEM — Z00.00 MEDICARE ANNUAL WELLNESS VISIT, INITIAL: Status: RESOLVED | Noted: 2023-11-22 | Resolved: 2024-01-21

## 2024-01-21 PROBLEM — Z12.11 SCREENING FOR COLON CANCER: Status: RESOLVED | Noted: 2023-11-22 | Resolved: 2024-01-21

## 2024-02-14 LAB — COLOGUARD RESULT REPORTABLE: POSITIVE

## 2024-02-16 ENCOUNTER — TELEPHONE (OUTPATIENT)
Age: 69
End: 2024-02-16

## 2024-02-16 NOTE — TELEPHONE ENCOUNTER
Pt calling asking why does she have to do consultation and not get scheduled for colonoscopy. I informed pt unfortunately she has to do consultation. Pt stated whatever and hung up on me.

## 2024-02-19 ENCOUNTER — TELEPHONE (OUTPATIENT)
Dept: FAMILY MEDICINE CLINIC | Facility: CLINIC | Age: 69
End: 2024-02-19

## 2024-02-19 DIAGNOSIS — R19.5 POSITIVE COLORECTAL CANCER SCREENING USING COLOGUARD TEST: Primary | ICD-10-CM

## 2024-02-19 NOTE — TELEPHONE ENCOUNTER
----- Message from ZEKE Ferrer sent at 2/19/2024  1:13 PM EST -----  Please let the patient know that her cologuard test was positive.   I put in a referral for gastroenterology for follow-up. Please give patient the information.

## 2024-02-21 ENCOUNTER — TELEPHONE (OUTPATIENT)
Dept: GASTROENTEROLOGY | Facility: CLINIC | Age: 69
End: 2024-02-21

## 2024-02-21 ENCOUNTER — OFFICE VISIT (OUTPATIENT)
Dept: GASTROENTEROLOGY | Facility: CLINIC | Age: 69
End: 2024-02-21
Payer: MEDICARE

## 2024-02-21 VITALS
BODY MASS INDEX: 23.99 KG/M2 | WEIGHT: 162 LBS | DIASTOLIC BLOOD PRESSURE: 80 MMHG | SYSTOLIC BLOOD PRESSURE: 128 MMHG | HEART RATE: 88 BPM | HEIGHT: 69 IN

## 2024-02-21 DIAGNOSIS — R19.5 POSITIVE COLORECTAL CANCER SCREENING USING COLOGUARD TEST: Primary | ICD-10-CM

## 2024-02-21 PROBLEM — Z13.6 SCREENING FOR CARDIOVASCULAR CONDITION: Status: RESOLVED | Noted: 2018-11-28 | Resolved: 2024-02-21

## 2024-02-21 PROCEDURE — 99203 OFFICE O/P NEW LOW 30 MIN: CPT | Performed by: PHYSICIAN ASSISTANT

## 2024-02-21 NOTE — TELEPHONE ENCOUNTER
Our mutual patient is scheduled for procedure:  Colonoscopy     On: 3/14/24     With: Dr. Salinas     Our office is requesting _Cardiac__ clearance for this procedure.     Physician Approving clearance: _______________________

## 2024-02-21 NOTE — H&P (VIEW-ONLY)
[facility] - Outpatient Consultation New Patient  Ibeth Rowley 68 y.o. female MRN: [id number]  Encounter: [id number]    ASSESSMENT AND PLAN:    1. Positive colorectal cancer screening using Cologuard test  Patient with recent + Cologuard. Last colonoscopy in 2014 and normal. No family history of colon cancer. No personal history of colon polyps. No  alarm symptoms.     Will order colonoscopy at time.     - Colonoscopy; Future  - polyethylene glycol (GOLYTELY) 4000 mL solution; Take 4,000 mL by mouth once for 1 dose Take as directed by office instructions prior to colonoscopy.  Dispense: 4000 mL; Refill: 0    Patient was instructed to call the office with any questions, concerns, new/ worsening/ persisting GI symptoms. Advised patient go to the ER with any severe or worsening abdominal pain, fevers/ chills, intractable N/V, chest pain, SOB, dizziness, lightheadedness, feeling something stuck in esophagus that will not go down. Patient expressed understanding and is in agreement with treatment plan.     Will plan to follow up as needed   ________________________________________________________    HPI:    Patient is new to me and our office.  Patient with a past medical history of mitral valve prolapse, anxiety presents to the office today as a referral for positive Cologuard.      Patient reports feeling well today.  No acute complaints today.  She tells me her bowel movements are regular, 1-2 x/ day.  Stool is brown and formed.  She does note baseline occasional abdominal pain and diarrhea when she eats certain foods like Pasta/carbs.  She declines/refuses celiac testing.   Patient denies any fevers/ chills, unintentional weight loss, chronic abdominal pain, change in bowel habit, decreased appetite, nausea, vomiting, black or bloody stools, heartburn, dysphagia, odynophagia.   Denies chest pain, SOB    Tobacco use- None  Alcohol use- Occasional   NSAID use- None    She denies personal  history of colon polyps   She denies FH of CRC or colon polyps     REVIEW OF SYSTEMS:    Review of Systems   Constitutional:  Negative for chills and fever.   HENT:  Negative for ear pain and sore throat.    Eyes:  Negative for pain and visual disturbance.   Respiratory:  Negative for cough and shortness of breath.    Cardiovascular:  Negative for chest pain and palpitations.   Gastrointestinal:  Negative for anal bleeding and vomiting.   Genitourinary:  Negative for dysuria and hematuria.   Musculoskeletal:  Negative for arthralgias and back pain.   Skin:  Negative for color change and rash.   Neurological:  Negative for seizures and syncope.   All other systems reviewed and are negative.       Historical Information   Past Medical History:   Diagnosis Date    Anxiety     Change in bowel movement 02/14/2020    Chronic RLQ pain 02/14/2020    Mitral prolapse     PTSD (post-traumatic stress disorder) 05/05/2022     Past Surgical History:   Procedure Laterality Date    CHOLECYSTECTOMY      HYSTERECTOMY      SINUS SURGERY       Social History   Social History     Substance and Sexual Activity   Alcohol Use Yes    Comment: socially     Social History     Substance and Sexual Activity   Drug Use No     Social History     Tobacco Use   Smoking Status Never   Smokeless Tobacco Never     Family History   Problem Relation Age of Onset    Atrial fibrillation Mother     Hypertension Mother     Atrial fibrillation Father     Heart disease Brother     Diabetes Brother     No Known Problems Daughter     No Known Problems Daughter     Anxiety disorder Paternal Grandmother     Breast cancer Maternal Aunt         in her 50;s    Colon cancer Neg Hx        Meds/Allergies       Current Outpatient Medications:     ALPRAZolam (XANAX) 0.25 mg tablet    busPIRone (BUSPAR) 5 mg tablet    Calcium-Cholecalciferol-Zinc 650-20-5.5 MG-MCG-MG CHEW    propranolol (INDERAL) 10 mg tablet    Allergies   Allergen Reactions    Latex Rash    Wound  Dressing Adhesive Rash           Objective   Wt Readings from Last 3 Encounters:   02/21/24 73.5 kg (162 lb)   11/22/23 75.8 kg (167 lb)   07/21/23 74.8 kg (165 lb)     Temp Readings from Last 3 Encounters:   10/20/22 (!) 96.9 °F (36.1 °C) (Tympanic)   06/20/22 (!) 97.4 °F (36.3 °C) (Tympanic)   05/05/22 (!) 97 °F (36.1 °C) (Tympanic)     BP Readings from Last 3 Encounters:   02/21/24 128/80   11/22/23 122/60   07/21/23 124/78     Pulse Readings from Last 3 Encounters:   02/21/24 88   11/22/23 83   07/21/23 94        PHYSICAL EXAM:     Physical Exam  Vitals reviewed.   Constitutional:       General: She is not in acute distress.     Appearance: She is not toxic-appearing.   HENT:      Head: Normocephalic and atraumatic.   Eyes:      Extraocular Movements: Extraocular movements intact.      Conjunctiva/sclera: Conjunctivae normal.   Cardiovascular:      Rate and Rhythm: Normal rate and regular rhythm.   Pulmonary:      Effort: Pulmonary effort is normal. No respiratory distress.      Breath sounds: Normal breath sounds.   Abdominal:      General: Bowel sounds are normal.      Palpations: Abdomen is soft.      Tenderness: There is no abdominal tenderness.   Musculoskeletal:         General: No swelling or tenderness.      Cervical back: Normal range of motion and neck supple.   Skin:     General: Skin is warm and dry.      Coloration: Skin is not jaundiced.   Neurological:      General: No focal deficit present.      Mental Status: She is alert and oriented to person, place, and time. Mental status is at baseline.   Psychiatric:         Mood and Affect: Mood normal.         Behavior: Behavior normal.         Thought Content: Thought content normal.             Lab Results:   No visits with results within 1 Day(s) from this visit.   Latest known visit with results is:   Office Visit on 11/22/2023   Component Date Value    Cologuard Result 02/01/2024 Positive (A)        Lab Results   Component Value Date    WBC 5.43  06/15/2022    HGB 14.5 06/15/2022    HCT 43.8 06/15/2022    MCV 91 06/15/2022     06/15/2022       Lab Results   Component Value Date    SODIUM 137 06/15/2022    K 4.1 06/15/2022     06/15/2022    CO2 28 06/15/2022    AGAP 8 06/15/2022    BUN 12 06/15/2022    CREATININE 0.70 06/15/2022    GLUF 104 (H) 06/15/2022    CALCIUM 9.5 06/15/2022    AST 16 06/15/2022    ALT 12 06/15/2022    ALKPHOS 62 06/15/2022    TP 7.3 06/15/2022    TBILI 0.46 06/15/2022    EGFR 91 06/15/2022       Prior Procedure Results/Reports   Last Colonoscopy reviewed done in 2014 and completely normal      Ashley Rollins PA-C   Available on [facility]

## 2024-02-21 NOTE — PATIENT INSTRUCTIONS
Scheduled date of colonoscopy (as of today): 3/14/24  Physician performing colonoscopy: Dr. Salinas   Location of colonoscopy: [facility]   Bowel prep reviewed with patient: Golytely  Instructions reviewed with patient by:Radha   Clearances:

## 2024-02-21 NOTE — PROGRESS NOTES
[facility] - Outpatient Consultation New Patient  Ibeth Rowley 68 y.o. female MRN: [id number]  Encounter: [id number]    ASSESSMENT AND PLAN:    1. Positive colorectal cancer screening using Cologuard test  Patient with recent + Cologuard. Last colonoscopy in 2014 and normal. No family history of colon cancer. No personal history of colon polyps. No  alarm symptoms.     Will order colonoscopy at time.     - Colonoscopy; Future  - polyethylene glycol (GOLYTELY) 4000 mL solution; Take 4,000 mL by mouth once for 1 dose Take as directed by office instructions prior to colonoscopy.  Dispense: 4000 mL; Refill: 0    Patient was instructed to call the office with any questions, concerns, new/ worsening/ persisting GI symptoms. Advised patient go to the ER with any severe or worsening abdominal pain, fevers/ chills, intractable N/V, chest pain, SOB, dizziness, lightheadedness, feeling something stuck in esophagus that will not go down. Patient expressed understanding and is in agreement with treatment plan.     Will plan to follow up as needed   ________________________________________________________    HPI:    Patient is new to me and our office.  Patient with a past medical history of mitral valve prolapse, anxiety presents to the office today as a referral for positive Cologuard.      Patient reports feeling well today.  No acute complaints today.  She tells me her bowel movements are regular, 1-2 x/ day.  Stool is brown and formed.  She does note baseline occasional abdominal pain and diarrhea when she eats certain foods like Pasta/carbs.  She declines/refuses celiac testing.   Patient denies any fevers/ chills, unintentional weight loss, chronic abdominal pain, change in bowel habit, decreased appetite, nausea, vomiting, black or bloody stools, heartburn, dysphagia, odynophagia.   Denies chest pain, SOB    Tobacco use- None  Alcohol use- Occasional   NSAID use- None    She denies personal  history of colon polyps   She denies FH of CRC or colon polyps     REVIEW OF SYSTEMS:    Review of Systems   Constitutional:  Negative for chills and fever.   HENT:  Negative for ear pain and sore throat.    Eyes:  Negative for pain and visual disturbance.   Respiratory:  Negative for cough and shortness of breath.    Cardiovascular:  Negative for chest pain and palpitations.   Gastrointestinal:  Negative for anal bleeding and vomiting.   Genitourinary:  Negative for dysuria and hematuria.   Musculoskeletal:  Negative for arthralgias and back pain.   Skin:  Negative for color change and rash.   Neurological:  Negative for seizures and syncope.   All other systems reviewed and are negative.       Historical Information   Past Medical History:   Diagnosis Date    Anxiety     Change in bowel movement 02/14/2020    Chronic RLQ pain 02/14/2020    Mitral prolapse     PTSD (post-traumatic stress disorder) 05/05/2022     Past Surgical History:   Procedure Laterality Date    CHOLECYSTECTOMY      HYSTERECTOMY      SINUS SURGERY       Social History   Social History     Substance and Sexual Activity   Alcohol Use Yes    Comment: socially     Social History     Substance and Sexual Activity   Drug Use No     Social History     Tobacco Use   Smoking Status Never   Smokeless Tobacco Never     Family History   Problem Relation Age of Onset    Atrial fibrillation Mother     Hypertension Mother     Atrial fibrillation Father     Heart disease Brother     Diabetes Brother     No Known Problems Daughter     No Known Problems Daughter     Anxiety disorder Paternal Grandmother     Breast cancer Maternal Aunt         in her 50;s    Colon cancer Neg Hx        Meds/Allergies       Current Outpatient Medications:     ALPRAZolam (XANAX) 0.25 mg tablet    busPIRone (BUSPAR) 5 mg tablet    Calcium-Cholecalciferol-Zinc 650-20-5.5 MG-MCG-MG CHEW    propranolol (INDERAL) 10 mg tablet    Allergies   Allergen Reactions    Latex Rash    Wound  Dressing Adhesive Rash           Objective   Wt Readings from Last 3 Encounters:   02/21/24 73.5 kg (162 lb)   11/22/23 75.8 kg (167 lb)   07/21/23 74.8 kg (165 lb)     Temp Readings from Last 3 Encounters:   10/20/22 (!) 96.9 °F (36.1 °C) (Tympanic)   06/20/22 (!) 97.4 °F (36.3 °C) (Tympanic)   05/05/22 (!) 97 °F (36.1 °C) (Tympanic)     BP Readings from Last 3 Encounters:   02/21/24 128/80   11/22/23 122/60   07/21/23 124/78     Pulse Readings from Last 3 Encounters:   02/21/24 88   11/22/23 83   07/21/23 94        PHYSICAL EXAM:     Physical Exam  Vitals reviewed.   Constitutional:       General: She is not in acute distress.     Appearance: She is not toxic-appearing.   HENT:      Head: Normocephalic and atraumatic.   Eyes:      Extraocular Movements: Extraocular movements intact.      Conjunctiva/sclera: Conjunctivae normal.   Cardiovascular:      Rate and Rhythm: Normal rate and regular rhythm.   Pulmonary:      Effort: Pulmonary effort is normal. No respiratory distress.      Breath sounds: Normal breath sounds.   Abdominal:      General: Bowel sounds are normal.      Palpations: Abdomen is soft.      Tenderness: There is no abdominal tenderness.   Musculoskeletal:         General: No swelling or tenderness.      Cervical back: Normal range of motion and neck supple.   Skin:     General: Skin is warm and dry.      Coloration: Skin is not jaundiced.   Neurological:      General: No focal deficit present.      Mental Status: She is alert and oriented to person, place, and time. Mental status is at baseline.   Psychiatric:         Mood and Affect: Mood normal.         Behavior: Behavior normal.         Thought Content: Thought content normal.             Lab Results:   No visits with results within 1 Day(s) from this visit.   Latest known visit with results is:   Office Visit on 11/22/2023   Component Date Value    Cologuard Result 02/01/2024 Positive (A)        Lab Results   Component Value Date    WBC 5.43  06/15/2022    HGB 14.5 06/15/2022    HCT 43.8 06/15/2022    MCV 91 06/15/2022     06/15/2022       Lab Results   Component Value Date    SODIUM 137 06/15/2022    K 4.1 06/15/2022     06/15/2022    CO2 28 06/15/2022    AGAP 8 06/15/2022    BUN 12 06/15/2022    CREATININE 0.70 06/15/2022    GLUF 104 (H) 06/15/2022    CALCIUM 9.5 06/15/2022    AST 16 06/15/2022    ALT 12 06/15/2022    ALKPHOS 62 06/15/2022    TP 7.3 06/15/2022    TBILI 0.46 06/15/2022    EGFR 91 06/15/2022       Prior Procedure Results/Reports   Last Colonoscopy reviewed done in 2014 and completely normal      Ashley Rollins PA-C   Available on [facility]

## 2024-02-28 ENCOUNTER — OFFICE VISIT (OUTPATIENT)
Dept: FAMILY MEDICINE CLINIC | Facility: CLINIC | Age: 69
End: 2024-02-28
Payer: MEDICARE

## 2024-02-28 VITALS
RESPIRATION RATE: 16 BRPM | BODY MASS INDEX: 24.29 KG/M2 | HEIGHT: 69 IN | HEART RATE: 84 BPM | DIASTOLIC BLOOD PRESSURE: 82 MMHG | WEIGHT: 164 LBS | OXYGEN SATURATION: 98 % | SYSTOLIC BLOOD PRESSURE: 122 MMHG

## 2024-02-28 DIAGNOSIS — I34.1 MVP (MITRAL VALVE PROLAPSE): ICD-10-CM

## 2024-02-28 DIAGNOSIS — F41.9 ANXIETY: Primary | ICD-10-CM

## 2024-02-28 DIAGNOSIS — R19.5 POSITIVE COLORECTAL CANCER SCREENING USING COLOGUARD TEST: ICD-10-CM

## 2024-02-28 PROCEDURE — 99214 OFFICE O/P EST MOD 30 MIN: CPT | Performed by: NURSE PRACTITIONER

## 2024-02-28 NOTE — PROGRESS NOTES
Name: Ibeth Rowley      : 1955      MRN: [id number]  Encounter Provider: ZEKE Ferrer  Encounter Date: 2024   Encounter department: [facility]    Assessment & Plan     1. Anxiety  Patient takes propanolol 10mg daily and Buspar 5mg BID and it helps.   Denies any depression or suicidal thoughts.   Patient rarely takes xanax for anxiety.   Continue current medications.     2. MVP (mitral valve prolapse)  -     Ambulatory Referral to Cardiology; Future    Denies any chest pain or sob.   Patient reports that she has not seen a cardiologist in a long time.   Patient referred to [facility] cardiology for further evaluation.     3. Positive colorectal cancer screening using Cologuard test  Patient had a follow-up appointment with gastroenterology and has a colonoscopy scheduled in March.     Patient instructed to get her lab work done. Will follow-up results with the patient.   Patient instructed to follow-up in 6 months or sooner prn.            Subjective      Patient is here for a follow-up for chronic medical conditions.   Patient takes propranolol 10mg daily and Buspar 5mg BID for anxiety and it helps.   Patient reports that she rarely takes xanax for anxiety.   Denies any depression or suicidal thoughts.   Patient is here for a follow-up for MVP.   Denies any chest pain or SOB.   Patient reports that she followed up with gastroenterology for a positive cologuard screening test and has a colonoscopy scheduled in March.         Review of Systems   Constitutional:  Negative for chills and fever.   HENT:  Negative for ear pain, sinus pressure and sore throat.    Respiratory:  Negative for cough, shortness of breath and wheezing.    Cardiovascular:  Negative for chest pain.   Gastrointestinal:  Negative for abdominal pain, diarrhea, nausea and vomiting.   Skin:  Negative for rash.   Neurological:  Negative for dizziness, syncope and headaches.   Psychiatric/Behavioral:   "Negative for suicidal ideas.         As noted in HPI.        Current Outpatient Medications on File Prior to Visit   Medication Sig    ALPRAZolam (XANAX) 0.25 mg tablet Take 1 tablet (0.25 mg total) by mouth daily at bedtime as needed for anxiety    busPIRone (BUSPAR) 5 mg tablet Take 1 tablet (5 mg total) by mouth 2 (two) times a day    Calcium-Cholecalciferol-Zinc 650-20-5.5 MG-MCG-MG CHEW Chew    propranolol (INDERAL) 10 mg tablet Take 1 tablet (10 mg total) by mouth daily at bedtime    polyethylene glycol (GOLYTELY) 4000 mL solution Take 4,000 mL by mouth once for 1 dose Take as directed by office instructions prior to colonoscopy.       Objective     /82 (BP Location: Left arm, Patient Position: Sitting, Cuff Size: Standard)   Pulse 84   Resp 16   Ht 5' 8.5\" (1.74 m)   Wt 74.4 kg (164 lb)   SpO2 98%   BMI 24.57 kg/m²     Physical Exam  Vitals reviewed.   Constitutional:       General: She is not in acute distress.     Appearance: Normal appearance. She is not ill-appearing or diaphoretic.   HENT:      Right Ear: External ear normal.      Left Ear: External ear normal.      Nose: Nose normal.      Mouth/Throat:      Mouth: Mucous membranes are moist.      Pharynx: Oropharynx is clear. No oropharyngeal exudate or posterior oropharyngeal erythema.   Eyes:      Conjunctiva/sclera: Conjunctivae normal.      Pupils: Pupils are equal, round, and reactive to light.   Cardiovascular:      Rate and Rhythm: Normal rate and regular rhythm.      Pulses: Normal pulses.      Comments: No edema noted.   Pulmonary:      Effort: Pulmonary effort is normal. No respiratory distress.      Breath sounds: Normal breath sounds. No wheezing.   Musculoskeletal:      Comments: Gait wnl.    Skin:     Findings: No rash.   Neurological:      Mental Status: She is alert and oriented to person, place, and time.   Psychiatric:         Mood and Affect: Mood normal.       ZEKE Ferrer    "

## 2024-03-11 ENCOUNTER — APPOINTMENT (OUTPATIENT)
Dept: LAB | Facility: CLINIC | Age: 69
End: 2024-03-11
Payer: MEDICARE

## 2024-03-11 DIAGNOSIS — E78.00 ELEVATED LDL CHOLESTEROL LEVEL: ICD-10-CM

## 2024-03-11 DIAGNOSIS — Z90.710 HISTORY OF HYSTERECTOMY FOR BENIGN DISEASE: ICD-10-CM

## 2024-03-11 DIAGNOSIS — I34.1 MVP (MITRAL VALVE PROLAPSE): ICD-10-CM

## 2024-03-11 LAB
ALBUMIN SERPL BCP-MCNC: 4.3 G/DL (ref 3.5–5)
ALP SERPL-CCNC: 50 U/L (ref 34–104)
ALT SERPL W P-5'-P-CCNC: 12 U/L (ref 7–52)
ANION GAP SERPL CALCULATED.3IONS-SCNC: 9 MMOL/L
AST SERPL W P-5'-P-CCNC: 16 U/L (ref 13–39)
BASOPHILS # BLD AUTO: 0.04 THOUSANDS/ÂΜL (ref 0–0.1)
BASOPHILS NFR BLD AUTO: 1 % (ref 0–1)
BILIRUB SERPL-MCNC: 0.59 MG/DL (ref 0.2–1)
BUN SERPL-MCNC: 11 MG/DL (ref 5–25)
CALCIUM SERPL-MCNC: 9.5 MG/DL (ref 8.4–10.2)
CHLORIDE SERPL-SCNC: 102 MMOL/L (ref 96–108)
CHOLEST SERPL-MCNC: 224 MG/DL
CO2 SERPL-SCNC: 28 MMOL/L (ref 21–32)
CREAT SERPL-MCNC: 0.66 MG/DL (ref 0.6–1.3)
CREAT UR-MCNC: 44.1 MG/DL
EOSINOPHIL # BLD AUTO: 0.21 THOUSAND/ÂΜL (ref 0–0.61)
EOSINOPHIL NFR BLD AUTO: 4 % (ref 0–6)
ERYTHROCYTE [DISTWIDTH] IN BLOOD BY AUTOMATED COUNT: 13.2 % (ref 11.6–15.1)
GFR SERPL CREATININE-BSD FRML MDRD: 92 ML/MIN/1.73SQ M
GLUCOSE P FAST SERPL-MCNC: 100 MG/DL (ref 65–99)
HCT VFR BLD AUTO: 43.9 % (ref 34.8–46.1)
HDLC SERPL-MCNC: 63 MG/DL
HGB BLD-MCNC: 14.2 G/DL (ref 11.5–15.4)
IMM GRANULOCYTES # BLD AUTO: 0.01 THOUSAND/UL (ref 0–0.2)
IMM GRANULOCYTES NFR BLD AUTO: 0 % (ref 0–2)
LDLC SERPL CALC-MCNC: 126 MG/DL (ref 0–100)
LYMPHOCYTES # BLD AUTO: 1.67 THOUSANDS/ÂΜL (ref 0.6–4.47)
LYMPHOCYTES NFR BLD AUTO: 29 % (ref 14–44)
MCH RBC QN AUTO: 30.3 PG (ref 26.8–34.3)
MCHC RBC AUTO-ENTMCNC: 32.3 G/DL (ref 31.4–37.4)
MCV RBC AUTO: 94 FL (ref 82–98)
MICROALBUMIN UR-MCNC: <7 MG/L
MICROALBUMIN/CREAT 24H UR: <16 MG/G CREATININE (ref 0–30)
MONOCYTES # BLD AUTO: 0.44 THOUSAND/ÂΜL (ref 0.17–1.22)
MONOCYTES NFR BLD AUTO: 8 % (ref 4–12)
NEUTROPHILS # BLD AUTO: 3.36 THOUSANDS/ÂΜL (ref 1.85–7.62)
NEUTS SEG NFR BLD AUTO: 58 % (ref 43–75)
NONHDLC SERPL-MCNC: 161 MG/DL
NRBC BLD AUTO-RTO: 0 /100 WBCS
PLATELET # BLD AUTO: 270 THOUSANDS/UL (ref 149–390)
PMV BLD AUTO: 10.4 FL (ref 8.9–12.7)
POTASSIUM SERPL-SCNC: 4 MMOL/L (ref 3.5–5.3)
PROT SERPL-MCNC: 6.7 G/DL (ref 6.4–8.4)
RBC # BLD AUTO: 4.69 MILLION/UL (ref 3.81–5.12)
SODIUM SERPL-SCNC: 139 MMOL/L (ref 135–147)
TRIGL SERPL-MCNC: 175 MG/DL
TSH SERPL DL<=0.05 MIU/L-ACNC: 1.9 UIU/ML (ref 0.45–4.5)
WBC # BLD AUTO: 5.73 THOUSAND/UL (ref 4.31–10.16)

## 2024-03-11 PROCEDURE — 82570 ASSAY OF URINE CREATININE: CPT

## 2024-03-11 PROCEDURE — 36415 COLL VENOUS BLD VENIPUNCTURE: CPT

## 2024-03-11 PROCEDURE — 85025 COMPLETE CBC W/AUTO DIFF WBC: CPT

## 2024-03-11 PROCEDURE — 80053 COMPREHEN METABOLIC PANEL: CPT

## 2024-03-11 PROCEDURE — 82043 UR ALBUMIN QUANTITATIVE: CPT

## 2024-03-11 PROCEDURE — 80061 LIPID PANEL: CPT

## 2024-03-13 ENCOUNTER — TELEPHONE (OUTPATIENT)
Dept: FAMILY MEDICINE CLINIC | Facility: CLINIC | Age: 69
End: 2024-03-13

## 2024-03-13 DIAGNOSIS — R73.01 ELEVATED FASTING BLOOD SUGAR: ICD-10-CM

## 2024-03-13 DIAGNOSIS — E78.5 HYPERLIPIDEMIA, UNSPECIFIED HYPERLIPIDEMIA TYPE: Primary | ICD-10-CM

## 2024-03-13 RX ORDER — LIDOCAINE HYDROCHLORIDE 10 MG/ML
0.5 INJECTION, SOLUTION EPIDURAL; INFILTRATION; INTRACAUDAL; PERINEURAL ONCE AS NEEDED
Status: CANCELLED | OUTPATIENT
Start: 2024-03-13

## 2024-03-13 RX ORDER — SODIUM CHLORIDE, SODIUM LACTATE, POTASSIUM CHLORIDE, CALCIUM CHLORIDE 600; 310; 30; 20 MG/100ML; MG/100ML; MG/100ML; MG/100ML
125 INJECTION, SOLUTION INTRAVENOUS CONTINUOUS
Status: CANCELLED | OUTPATIENT
Start: 2024-03-13

## 2024-03-13 NOTE — TELEPHONE ENCOUNTER
----- Message from ZEKE Ferrer sent at 3/13/2024  9:20 AM EDT -----  Please let the patient know that her cholesterol and triglycerides were elevated.   Patient's fasting blood sugar was also slightly elevated.   Please tell patient to eat a healthy low fat diet.   Repeat CMP and Lipid panel in 3 months.

## 2024-03-13 NOTE — TELEPHONE ENCOUNTER
----- Message from ZEKE Ferrer sent at 3/13/2024  9:17 AM EDT -----  Please let the patient know that her TSH was normal.     ----- Message -----  From: Tri Horton DO  Sent: 3/12/2024   7:29 PM EDT  To: ZEKE Ferrer    Your patient       ----- Message -----  From: Lab, Background User  Sent: 3/11/2024   1:59 PM EDT  To: Tri Horton DO

## 2024-03-14 ENCOUNTER — ANESTHESIA EVENT (OUTPATIENT)
Dept: GASTROENTEROLOGY | Facility: HOSPITAL | Age: 69
End: 2024-03-14

## 2024-03-14 ENCOUNTER — HOSPITAL ENCOUNTER (OUTPATIENT)
Dept: GASTROENTEROLOGY | Facility: HOSPITAL | Age: 69
Setting detail: OUTPATIENT SURGERY
End: 2024-03-14
Payer: MEDICARE

## 2024-03-14 ENCOUNTER — ANESTHESIA (OUTPATIENT)
Dept: GASTROENTEROLOGY | Facility: HOSPITAL | Age: 69
End: 2024-03-14

## 2024-03-14 VITALS
OXYGEN SATURATION: 96 % | BODY MASS INDEX: 23.91 KG/M2 | HEART RATE: 66 BPM | RESPIRATION RATE: 15 BRPM | WEIGHT: 161.4 LBS | TEMPERATURE: 97 F | HEIGHT: 69 IN | SYSTOLIC BLOOD PRESSURE: 131 MMHG | DIASTOLIC BLOOD PRESSURE: 69 MMHG

## 2024-03-14 DIAGNOSIS — R19.5 POSITIVE COLORECTAL CANCER SCREENING USING COLOGUARD TEST: ICD-10-CM

## 2024-03-14 PROCEDURE — G0121 COLON CA SCRN NOT HI RSK IND: HCPCS | Performed by: STUDENT IN AN ORGANIZED HEALTH CARE EDUCATION/TRAINING PROGRAM

## 2024-03-14 RX ORDER — LIDOCAINE HYDROCHLORIDE 10 MG/ML
0.5 INJECTION, SOLUTION EPIDURAL; INFILTRATION; INTRACAUDAL; PERINEURAL ONCE AS NEEDED
Status: DISCONTINUED | OUTPATIENT
Start: 2024-03-14 | End: 2024-03-18 | Stop reason: HOSPADM

## 2024-03-14 RX ORDER — SODIUM CHLORIDE, SODIUM LACTATE, POTASSIUM CHLORIDE, CALCIUM CHLORIDE 600; 310; 30; 20 MG/100ML; MG/100ML; MG/100ML; MG/100ML
125 INJECTION, SOLUTION INTRAVENOUS CONTINUOUS
Status: DISCONTINUED | OUTPATIENT
Start: 2024-03-14 | End: 2024-03-18 | Stop reason: HOSPADM

## 2024-03-14 RX ORDER — PROPOFOL 10 MG/ML
INJECTION, EMULSION INTRAVENOUS AS NEEDED
Status: DISCONTINUED | OUTPATIENT
Start: 2024-03-14 | End: 2024-03-14

## 2024-03-14 RX ORDER — LIDOCAINE HYDROCHLORIDE 10 MG/ML
INJECTION, SOLUTION EPIDURAL; INFILTRATION; INTRACAUDAL; PERINEURAL AS NEEDED
Status: DISCONTINUED | OUTPATIENT
Start: 2024-03-14 | End: 2024-03-14

## 2024-03-14 RX ADMIN — SODIUM CHLORIDE, SODIUM LACTATE, POTASSIUM CHLORIDE, AND CALCIUM CHLORIDE: .6; .31; .03; .02 INJECTION, SOLUTION INTRAVENOUS at 09:21

## 2024-03-14 RX ADMIN — Medication 40 MG: at 09:34

## 2024-03-14 RX ADMIN — PROPOFOL 50 MG: 10 INJECTION, EMULSION INTRAVENOUS at 09:28

## 2024-03-14 RX ADMIN — PROPOFOL 50 MG: 10 INJECTION, EMULSION INTRAVENOUS at 09:44

## 2024-03-14 RX ADMIN — LIDOCAINE HYDROCHLORIDE 50 MG: 10 INJECTION, SOLUTION EPIDURAL; INFILTRATION; INTRACAUDAL at 09:24

## 2024-03-14 RX ADMIN — PROPOFOL 50 MG: 10 INJECTION, EMULSION INTRAVENOUS at 09:50

## 2024-03-14 RX ADMIN — PROPOFOL 50 MG: 10 INJECTION, EMULSION INTRAVENOUS at 09:37

## 2024-03-14 RX ADMIN — PROPOFOL 100 MG: 10 INJECTION, EMULSION INTRAVENOUS at 09:24

## 2024-03-14 RX ADMIN — PROPOFOL 50 MG: 10 INJECTION, EMULSION INTRAVENOUS at 09:27

## 2024-03-14 NOTE — ANESTHESIA PREPROCEDURE EVALUATION
Procedure:  COLONOSCOPY    Mild to moderate MR  - on propanolol    Relevant Problems   CARDIO   (+) MVP (mitral valve prolapse)      NEURO/PSYCH   (+) Anxiety        Physical Exam    Airway    Mallampati score: III  TM Distance: >3 FB  Neck ROM: full     Dental   No notable dental hx     Cardiovascular  Rhythm: regular, Rate: normal, Cardiovascular exam normal    Pulmonary  Pulmonary exam normal Breath sounds clear to auscultation    Other Findings  post-pubertal.      Anesthesia Plan  ASA Score- 2     Anesthesia Type- IV sedation with anesthesia with ASA Monitors.         Additional Monitors:     Airway Plan:     Comment: Discussed risks/benefits, including medication reactions, awareness, aspiration, and serious/life threatening complications.    Plan to maintain native airway with IVGA, monitored with EtCO2.       Plan Factors-Exercise tolerance (METS): >4 METS.    Chart reviewed.    Patient summary reviewed.      Patient instructed to abstain from smoking on day of procedure. Patient did not smoke on day of surgery.            Induction- intravenous.    Postoperative Plan-     Informed Consent- Anesthetic plan and risks discussed with patient.  I personally reviewed this patient with the CRNA. Discussed and agreed on the Anesthesia Plan with the CRNA..

## 2024-03-14 NOTE — INTERVAL H&P NOTE
H&P reviewed. After examining the patient I find no changes in the patients condition since the H&P had been written.    Vitals:    03/14/24 0818   BP: 149/71   Pulse: 62   Resp: 18   Temp: 97.7 °F (36.5 °C)   SpO2: 100%     Informed consent was obtained for the procedure.  Risks of infection, perforation and hemorrhage were discussed. The patient was agreeable to proceed with the procedure.

## 2024-03-14 NOTE — ANESTHESIA POSTPROCEDURE EVALUATION
Post-Op Assessment Note    CV Status:  Stable  Pain Score: 0    Pain management: adequate       Mental Status:  Awake   Hydration Status:  Euvolemic and stable   PONV Controlled:  Controlled   Airway Patency:  Patent     Post Op Vitals Reviewed: Yes    No anethesia notable event occurred.    Staff: CRNA               BP   105/59   Temp   97.4   Pulse  71   Resp   14   SpO2   97%

## 2024-05-15 ENCOUNTER — CONSULT (OUTPATIENT)
Dept: FAMILY MEDICINE CLINIC | Facility: CLINIC | Age: 69
End: 2024-05-15
Payer: MEDICARE

## 2024-05-15 VITALS
HEIGHT: 69 IN | DIASTOLIC BLOOD PRESSURE: 82 MMHG | WEIGHT: 164 LBS | SYSTOLIC BLOOD PRESSURE: 122 MMHG | RESPIRATION RATE: 16 BRPM | OXYGEN SATURATION: 99 % | HEART RATE: 83 BPM | BODY MASS INDEX: 24.29 KG/M2

## 2024-05-15 DIAGNOSIS — I34.1 MVP (MITRAL VALVE PROLAPSE): ICD-10-CM

## 2024-05-15 DIAGNOSIS — F41.9 ANXIETY: ICD-10-CM

## 2024-05-15 DIAGNOSIS — H31.012 MACULAR SCAR OF LEFT EYE: ICD-10-CM

## 2024-05-15 DIAGNOSIS — Z01.818 PREOPERATIVE CLEARANCE: Primary | ICD-10-CM

## 2024-05-15 PROCEDURE — 93000 ELECTROCARDIOGRAM COMPLETE: CPT | Performed by: NURSE PRACTITIONER

## 2024-05-15 PROCEDURE — 99213 OFFICE O/P EST LOW 20 MIN: CPT | Performed by: NURSE PRACTITIONER

## 2024-05-15 RX ORDER — BUSPIRONE HYDROCHLORIDE 5 MG/1
5 TABLET ORAL 2 TIMES DAILY
Qty: 180 TABLET | Refills: 1 | Status: SHIPPED | OUTPATIENT
Start: 2024-05-15

## 2024-05-15 NOTE — PROGRESS NOTES
Ambulatory Visit  Name: Ibeth Rowley      : 1955      MRN: [id number]  Encounter Provider: ZEKE Ferrer  Encounter Date: 5/15/2024   Encounter department: [facility]    Assessment & Plan   1. Preoperative clearance  -     POCT ECG  2. MVP (mitral valve prolapse)  -     POCT ECG  3. Macular scar of left eye  -     POCT ECG      Patient is here for a preoperative clearance for left eye vitrectomy scheduled for 24.   EKG done and reviewed. EKG showed NSR, inferior infarct age undetermined.   Patient denies any chest pain or SOB with exertion or at rest.   Patient has a follow-up with cardiology scheduled for 24 for MVP after the procedure.   Discussed EKG with Dr. Bansal. Patient can follow-up with cardiology on 24.   Reviewed recent lab results.   Patient is cleared for left eye vitrectomy on 24.   Patient should follow-up with cardiology after the procedure.     History of Present Illness     Patient is here for a preoperative clearance for left eye vitrectomy for a macular scar. Patient reports that Dr. Recinos is performing the procedure on 24.   Patient has had surgery before. Denies any adverse reactions to anesthesia.   Patient has a cardiology appointment scheduled for  24 for MVP.   Denies any chest pain or SOB with exertion or at rest.   Denies any Has, dizziness, wheezing, palpitations, cough, nausea, or vomiting.   Denies any easy bleeding or bruising.   Patient reports that she has help after the surgery.         Review of Systems   Constitutional:  Negative for chills and fever.   HENT:  Negative for congestion, ear pain, sinus pressure, sore throat and trouble swallowing.    Eyes:  Negative for pain, discharge and redness.   Respiratory:  Negative for cough, chest tightness, shortness of breath and wheezing.    Cardiovascular:  Negative for chest pain, palpitations and leg swelling.   Gastrointestinal:  Negative for abdominal pain,  blood in stool, diarrhea and vomiting.   Genitourinary:  Negative for dysuria, frequency and pelvic pain.   Skin:  Negative for rash.   Neurological:  Negative for seizures, syncope and headaches.     Medical History Reviewed by provider this encounter:  Tobacco  Allergies  Meds  Med Hx  Surg Hx  Fam Hx  Soc Hx      Past Medical History   Past Medical History:   Diagnosis Date    Anxiety     Change in bowel movement 02/14/2020    Chronic RLQ pain 02/14/2020    Mitral prolapse     PTSD (post-traumatic stress disorder) 05/05/2022     Past Surgical History:   Procedure Laterality Date    CHOLECYSTECTOMY      HYSTERECTOMY      SINUS SURGERY       Family History   Problem Relation Age of Onset    Atrial fibrillation Mother     Hypertension Mother     Atrial fibrillation Father     Heart disease Brother     Diabetes Brother     No Known Problems Daughter     No Known Problems Daughter     Anxiety disorder Paternal Grandmother     Breast cancer Maternal Aunt         in her 50;s    Colon cancer Neg Hx      Current Outpatient Medications on File Prior to Visit   Medication Sig Dispense Refill    ALPRAZolam (XANAX) 0.25 mg tablet Take 1 tablet (0.25 mg total) by mouth daily at bedtime as needed for anxiety 30 tablet 0    Calcium-Cholecalciferol-Zinc 650-20-5.5 MG-MCG-MG CHEW Chew      propranolol (INDERAL) 10 mg tablet Take 1 tablet (10 mg total) by mouth daily at bedtime 90 tablet 1    [DISCONTINUED] busPIRone (BUSPAR) 5 mg tablet Take 1 tablet (5 mg total) by mouth 2 (two) times a day 180 tablet 1     No current facility-administered medications on file prior to visit.     Allergies   Allergen Reactions    Latex Rash    Wound Dressing Adhesive Rash      Current Outpatient Medications on File Prior to Visit   Medication Sig Dispense Refill    ALPRAZolam (XANAX) 0.25 mg tablet Take 1 tablet (0.25 mg total) by mouth daily at bedtime as needed for anxiety 30 tablet 0    Calcium-Cholecalciferol-Zinc 650-20-5.5 MG-MCG-MG  "CHEW Chew      propranolol (INDERAL) 10 mg tablet Take 1 tablet (10 mg total) by mouth daily at bedtime 90 tablet 1    [DISCONTINUED] busPIRone (BUSPAR) 5 mg tablet Take 1 tablet (5 mg total) by mouth 2 (two) times a day 180 tablet 1     No current facility-administered medications on file prior to visit.      Objective     /82 (BP Location: Right arm, Patient Position: Sitting, Cuff Size: Standard)   Pulse 83   Resp 16   Ht 5' 9\" (1.753 m)   Wt 74.4 kg (164 lb)   SpO2 99%   BMI 24.22 kg/m²     Physical Exam  Vitals reviewed.   Constitutional:       General: She is not in acute distress.     Appearance: Normal appearance. She is not ill-appearing or diaphoretic.   HENT:      Right Ear: Tympanic membrane, ear canal and external ear normal.      Left Ear: Tympanic membrane, ear canal and external ear normal.      Nose: Nose normal.      Mouth/Throat:      Mouth: Mucous membranes are moist.      Pharynx: Oropharynx is clear. No oropharyngeal exudate or posterior oropharyngeal erythema.   Eyes:      Conjunctiva/sclera: Conjunctivae normal.      Pupils: Pupils are equal, round, and reactive to light.   Cardiovascular:      Rate and Rhythm: Normal rate and regular rhythm.      Pulses: Normal pulses.      Comments: No edema noted   Pulmonary:      Effort: Pulmonary effort is normal. No respiratory distress.      Breath sounds: Normal breath sounds. No wheezing.   Abdominal:      General: There is no distension.      Palpations: Abdomen is soft. There is no mass.      Tenderness: There is no abdominal tenderness.   Skin:     Findings: No rash.   Neurological:      Mental Status: She is alert and oriented to person, place, and time.   Psychiatric:         Mood and Affect: Mood normal.       Administrative Statements     "

## 2024-05-16 ENCOUNTER — TELEPHONE (OUTPATIENT)
Age: 69
End: 2024-05-16

## 2024-05-16 NOTE — TELEPHONE ENCOUNTER
Pt called wanting to verify if a copy of her EKG was faxed over to cardiology. Please advise pt, I did not see any notes in the chart from her appointment yesterday.

## 2024-05-17 ENCOUNTER — TELEPHONE (OUTPATIENT)
Age: 69
End: 2024-05-17

## 2024-05-17 NOTE — TELEPHONE ENCOUNTER
Ibeth called, on Wednesday, Adriana discussed the pt's EKG with her, there was mention of her being at risk for having a heart attack. She would like further clarification on that. Pt advised Adriana is out of the office today, if another provider would be able to explain it, or she can wait for Monday when Adriana is back. Please advise

## 2024-05-20 ENCOUNTER — TELEPHONE (OUTPATIENT)
Age: 69
End: 2024-05-20

## 2024-05-20 NOTE — TELEPHONE ENCOUNTER
Trina from Dr. Recinos's office called.  They did not receive the EKG tracing for the patient.  Surgery is next week.  Please fax tracing to [phone].  Thank you.

## 2024-05-29 ENCOUNTER — CONSULT (OUTPATIENT)
Dept: CARDIOLOGY CLINIC | Facility: CLINIC | Age: 69
End: 2024-05-29
Payer: MEDICARE

## 2024-05-29 VITALS
HEART RATE: 62 BPM | BODY MASS INDEX: 23.99 KG/M2 | HEIGHT: 69 IN | OXYGEN SATURATION: 98 % | DIASTOLIC BLOOD PRESSURE: 80 MMHG | TEMPERATURE: 97.5 F | WEIGHT: 162 LBS | SYSTOLIC BLOOD PRESSURE: 112 MMHG

## 2024-05-29 DIAGNOSIS — I34.1 MVP (MITRAL VALVE PROLAPSE): Primary | ICD-10-CM

## 2024-05-29 DIAGNOSIS — R94.31 ABNORMAL EKG: ICD-10-CM

## 2024-05-29 DIAGNOSIS — I34.0 NON-RHEUMATIC MITRAL REGURGITATION: ICD-10-CM

## 2024-05-29 PROCEDURE — 99204 OFFICE O/P NEW MOD 45 MIN: CPT | Performed by: INTERNAL MEDICINE

## 2024-05-29 PROCEDURE — 93000 ELECTROCARDIOGRAM COMPLETE: CPT | Performed by: INTERNAL MEDICINE

## 2024-05-29 NOTE — PROGRESS NOTES
Cardiology Consultation     Ibeth Rowley  [id number]  1955  [facility] [place] [id number] [facility] [id number] [phone]      1. MVP (mitral valve prolapse)  Ambulatory Referral to Cardiology    Echo complete w/ contrast if indicated      2. Non-rheumatic mitral regurgitation        3. Abnormal EKG  POCT ECG    Stress test only, exercise          Discussion/Summary:    Mitral valve prolapse:  Reports this diagnosis being made over 20 years ago. Did not have regular echoes done until June 2022 at which time she has posterior leaflet prolapse and mild to moderate mitral regurgitation. Repeat echo for surveillance. Blood pressure is controlled. Diagnosis discussed in detail with the patient. She has been taking antibiotic prophylaxis for years prescribed by her dentist. Indicated to patient she does not need this from a cardiac standpoint.    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 Nonspecific T wave abnormalities. While she has no symptoms, she is not extraordinarily active. She has no blood pressure issues. Cholesterol and glucose are borderline. There is a family history of early CAD. Check exercise treadmill test.    Dyslipidemia:  Elevated triglycerides, slightly elevated LDL. ASCVD risk remains reasonable. Lifestyle modification recommended with surveillance of lipid panel is in the future. Ischemic evaluation as noted above.    Impaired fasting glucose:  Glucose was 100 on lab work in March. Lifestyle modification.        History of Present Illness:    69-year-old female comes to the office to establish with cardiology for history of mitral valve prolapse and also because an EKG done recently and preoperative evaluation prior to an eye surgery showed abnormality suggestive of an old inferior infarct.    Patient says she was diagnosed with mitral valve prolapse about 20 years ago. She was feeling palpitations  and saw cardiologist who did different maneuvers with auscultation and may have heard a murmur. She was told of mitral valve prolapse but did not have a repeat echo until just June 2022 which shows posterior leaflet prolapse and mild to moderate mitral regurgitation.  She does not do any formal exercise, but she walks occasionally does not feel any symptoms. No heart failure symptoms.      She has macular degeneration recently had surgery on her left eye. An EKG at her PCPs office showed old inferior infarct pattern. Patient does not have any chest pain. There is a family history of coronary disease. Her oldest brother had strokelike symptoms in his mid 40s and was found to have carotid disease. He also had coronary artery disease identified in his 50s with an MI. He has poor lifestyle. No other family members have early ASCVD or death.    Patient does have borderline cholesterol as well as impaired fasting glucose.    Patient Active Problem List   Diagnosis    Need for Tdap vaccination    Anxiety    MVP (mitral valve prolapse)    Ganglion cyst of dorsum of right wrist    Varicose veins of both lower extremities    History of hysterectomy for benign disease    Encounter for screening mammogram for breast cancer    Positive colorectal cancer screening using Cologuard test    Preoperative clearance    Macular scar of left eye     Past Medical History:   Diagnosis Date    Anxiety     Change in bowel movement 02/14/2020    Chronic RLQ pain 02/14/2020    Mitral prolapse     PTSD (post-traumatic stress disorder) 05/05/2022     Social History     Tobacco Use    Smoking status: Never    Smokeless tobacco: Never   Vaping Use    Vaping status: Never Used   Substance Use Topics    Alcohol use: Yes     Comment: socially    Drug use: No      Family History   Problem Relation Age of Onset    Atrial fibrillation Mother     Hypertension Mother     Atrial fibrillation Father     Heart disease Brother     Diabetes Brother     No  "Known Problems Daughter     No Known Problems Daughter     Anxiety disorder Paternal Grandmother     Breast cancer Maternal Aunt         in her 50;s    Colon cancer Neg Hx      Past Surgical History:   Procedure Laterality Date    CHOLECYSTECTOMY      HYSTERECTOMY      SINUS SURGERY         Current Outpatient Medications:     ALPRAZolam (XANAX) 0.25 mg tablet, Take 1 tablet (0.25 mg total) by mouth daily at bedtime as needed for anxiety, Disp: 30 tablet, Rfl: 0    busPIRone (BUSPAR) 5 mg tablet, TAKE 1 TABLET BY MOUTH TWICE A DAY, Disp: 180 tablet, Rfl: 1    Calcium-Cholecalciferol-Zinc 650-20-5.5 MG-MCG-MG CHEW, Chew, Disp: , Rfl:     propranolol (INDERAL) 10 mg tablet, Take 1 tablet (10 mg total) by mouth daily at bedtime, Disp: 90 tablet, Rfl: 1  Allergies   Allergen Reactions    Latex Rash    Wound Dressing Adhesive Rash       Vitals:    05/29/24 0834   BP: 112/80   BP Location: Left arm   Patient Position: Sitting   Cuff Size: Standard   Pulse: 62   Temp: 97.5 °F (36.4 °C)   SpO2: 98%   Weight: 73.5 kg (162 lb)   Height: 5' 9\" (1.753 m)     Vitals:    05/29/24 0834   Weight: 73.5 kg (162 lb)      Height: 5' 9\" (175.3 cm)   Body mass index is 23.92 kg/m².    Physical Exam:  GENERAL: Alert, well appearing, and in no distress  HEENT:  L eye patch after surgery  NECK:  Supple, No elevated JVP, no thyromegaly, no carotid bruits  HEART:  Regular rate and rhythm, normal S1/S2, no S3/S4, no murmur or rub  LUNGS:  Clear to auscultation bilaterally  ABDOMEN:  Soft, non-tender, positive bowel sounds, no rebound or guarding  EXTREMITIES:  No edema  VASCULAR:  Normal pedal pulses   NEURO: No focal deficits,  SKIN: Normal without suspicious lesions on exposed skin    ROS:  Positive for vision changes, ocular migraines, anxiety, palpitations.  Except as noted in HPI, is otherwise reviewed in detail and a 12 point review of systems is negative.    Labs:  Lab Results   Component Value Date    SODIUM 139 03/11/2024    K 4.0 " "03/11/2024     03/11/2024    CREATININE 0.66 03/11/2024    BUN 11 03/11/2024    CO2 28 03/11/2024    ALT 12 03/11/2024    AST 16 03/11/2024    GLUF 100 (H) 03/11/2024    WBC 5.73 03/11/2024    HGB 14.2 03/11/2024    HCT 43.9 03/11/2024     03/11/2024       No results found for: \"CHOL\"  Lab Results   Component Value Date    HDL 63 03/11/2024    HDL 51 06/15/2022     Lab Results   Component Value Date    LDLCALC 126 (H) 03/11/2024    LDLCALC 124 (H) 06/15/2022     Lab Results   Component Value Date    TRIG 175 (H) 03/11/2024    TRIG 128 06/15/2022       Testing:  Echo 6/2022  Left Ventricle: Left ventricular cavity size is normal. Wall thickness is normal. The left ventricular ejection fraction is 55%. Systolic function is normal. Wall motion is normal. Diastolic function is normal for age.    Right Ventricle: Right ventricular cavity size is normal. Systolic function is normal.    Mitral Valve: The posterior leaflet is mild-moderately prolapsed in late systole. There is mild to moderate regurgitation.    Tricuspid Valve: There is trace regurgitation. The right ventricular systolic pressure is normal. The estimated right ventricular systolic pressure is 22 mmHg.    EKG:  Sinus rhythm. 62 bpm. Nonspecific T wave abnormality. Left axis deviation. Possible old inferior infarct pattern.    "

## 2024-06-14 ENCOUNTER — HOSPITAL ENCOUNTER (OUTPATIENT)
Dept: NON INVASIVE DIAGNOSTICS | Facility: HOSPITAL | Age: 69
Discharge: HOME | End: 2024-06-14
Attending: INTERNAL MEDICINE
Payer: MEDICARE

## 2024-06-14 VITALS
DIASTOLIC BLOOD PRESSURE: 80 MMHG | SYSTOLIC BLOOD PRESSURE: 112 MMHG | HEIGHT: 69 IN | HEART RATE: 62 BPM | WEIGHT: 162 LBS | BODY MASS INDEX: 23.99 KG/M2

## 2024-06-14 DIAGNOSIS — I34.1 MVP (MITRAL VALVE PROLAPSE): ICD-10-CM

## 2024-06-14 LAB
AORTIC ROOT: 2.9 CM
APICAL FOUR CHAMBER EJECTION FRACTION: 58 %
ASCENDING AORTA: 3 CM
BSA FOR ECHO PROCEDURE: 1.89 M2
E WAVE DECELERATION TIME: 255 MS
E/A RATIO: 0.91
FRACTIONAL SHORTENING: 29 (ref 28–44)
INTERVENTRICULAR SEPTUM IN DIASTOLE (PARASTERNAL SHORT AXIS VIEW): 0.9 CM
INTERVENTRICULAR SEPTUM: 0.9 CM (ref 0.6–1.1)
LAAS-AP2: 23.3 CM2
LAAS-AP4: 18.7 CM2
LEFT ATRIUM SIZE: 4.3 CM
LEFT ATRIUM VOLUME (MOD BIPLANE): 65 ML
LEFT ATRIUM VOLUME INDEX (MOD BIPLANE): 34.4 ML/M2
LEFT INTERNAL DIMENSION IN SYSTOLE: 4.1 CM (ref 2.1–4)
LEFT VENTRICULAR INTERNAL DIMENSION IN DIASTOLE: 5.8 CM (ref 3.5–6)
LEFT VENTRICULAR POSTERIOR WALL IN END DIASTOLE: 0.6 CM
LEFT VENTRICULAR STROKE VOLUME: 94 ML
LVSV (TEICH): 94 ML
MV E'TISSUE VEL-SEP: 8 CM/S
MV PEAK A VEL: 0.47 M/S
MV PEAK E VEL: 43 CM/S
MV STENOSIS PRESSURE HALF TIME: 74 MS
MV VALVE AREA P 1/2 METHOD: 2.97
RIGHT ATRIUM AREA SYSTOLE A4C: 18 CM2
RIGHT VENTRICLE ID DIMENSION: 3.7 CM
SL CV LEFT ATRIUM LENGTH A2C: 5.3 CM
SL CV LV EF: 60
SL CV PED ECHO LEFT VENTRICLE DIASTOLIC VOLUME (MOD BIPLANE) 2D: 168 ML
SL CV PED ECHO LEFT VENTRICLE SYSTOLIC VOLUME (MOD BIPLANE) 2D: 74 ML
TRICUSPID ANNULAR PLANE SYSTOLIC EXCURSION: 1.8 CM

## 2024-06-14 PROCEDURE — 93306 TTE W/DOPPLER COMPLETE: CPT

## 2024-06-14 PROCEDURE — 93306 TTE W/DOPPLER COMPLETE: CPT | Performed by: INTERNAL MEDICINE

## 2024-06-24 ENCOUNTER — OFFICE VISIT (OUTPATIENT)
Dept: FAMILY MEDICINE CLINIC | Facility: CLINIC | Age: 69
End: 2024-06-24
Payer: MEDICARE

## 2024-06-24 VITALS
OXYGEN SATURATION: 98 % | DIASTOLIC BLOOD PRESSURE: 70 MMHG | WEIGHT: 165 LBS | RESPIRATION RATE: 16 BRPM | BODY MASS INDEX: 24.44 KG/M2 | SYSTOLIC BLOOD PRESSURE: 124 MMHG | HEIGHT: 69 IN | HEART RATE: 78 BPM

## 2024-06-24 DIAGNOSIS — M79.642 HAND PAIN, LEFT: Primary | ICD-10-CM

## 2024-06-24 DIAGNOSIS — M79.645 THUMB PAIN, LEFT: ICD-10-CM

## 2024-06-24 PROCEDURE — 99213 OFFICE O/P EST LOW 20 MIN: CPT | Performed by: FAMILY MEDICINE

## 2024-06-24 PROCEDURE — G2211 COMPLEX E/M VISIT ADD ON: HCPCS | Performed by: FAMILY MEDICINE

## 2024-06-24 RX ORDER — DICLOFENAC SODIUM 75 MG/1
75 TABLET, DELAYED RELEASE ORAL 2 TIMES DAILY
Qty: 14 TABLET | Refills: 0 | Status: SHIPPED | OUTPATIENT
Start: 2024-06-24

## 2024-06-24 NOTE — ASSESSMENT & PLAN NOTE
Painful flexion of the left thumb increased in last few days, in the past she had a little bit discomfort, advised to get CBC uric acid and she will also get the x-ray of the area,   Advised rest ice packs 3 times a day and NSAIDs, she has been taking ibuprofen 200 mg without any relief  If pain continues she will see the hand doctor

## 2024-06-24 NOTE — PROGRESS NOTES
Ambulatory Visit  Name: Ibeth Rowley      : 1955      MRN: [id number]  Encounter Provider: Irish Galvan MD  Encounter Date: 2024   Encounter department: [facility]    Assessment & Plan   1. Hand pain, left  Assessment & Plan:  Painful flexion of the left thumb increased in last few days, in the past she had a little bit discomfort, advised to get CBC uric acid and she will also get the x-ray of the area,   Advised rest ice packs 3 times a day and NSAIDs, she has been taking ibuprofen 200 mg without any relief  If pain continues she will see the hand doctor  Orders:  -     CBC and Platelet; Future  -     Uric acid; Future; Expected date: 2024  -     diclofenac (VOLTAREN) 75 mg EC tablet; Take 1 tablet (75 mg total) by mouth 2 (two) times a day  -     XR hand 3+ vw left; Future; Expected date: 2024  2. Thumb pain, left  Assessment & Plan:  Painful flexion of the left thumb increased in last few days, in the past she had a little bit discomfort, advised to get CBC uric acid and she will also get the x-ray of the area,   Advised rest ice packs 3 times a day and NSAIDs, she has been taking ibuprofen 200 mg without any relief  If pain continues she will see the hand doctor  Orders:  -     CBC and Platelet; Future  -     Uric acid; Future; Expected date: 2024  -     diclofenac (VOLTAREN) 75 mg EC tablet; Take 1 tablet (75 mg total) by mouth 2 (two) times a day  -     XR hand 3+ vw left; Future; Expected date: 2024     F/u if not better   History of Present Illness     She complains of pain in the left thumb at the base which was going on for a while but not for last few days she is unable to pinch anything and is very painful,, she has history of gout in the past but that was affecting her toes,  No recent fever or chills and no injury, she is right-handed person      Review of Systems   Constitutional: Negative.    HENT: Negative.     Eyes: Negative.   "  Respiratory: Negative.     Cardiovascular: Negative.    Gastrointestinal: Negative.    Musculoskeletal:         Left hand pain at the base of the left thumb and this painful even pinching or holding things and buttoning her pant     Past Medical History:   Diagnosis Date   • Anxiety    • Change in bowel movement 02/14/2020   • Chronic RLQ pain 02/14/2020   • Mitral prolapse    • PTSD (post-traumatic stress disorder) 05/05/2022     Past Surgical History:   Procedure Laterality Date   • CHOLECYSTECTOMY     • HYSTERECTOMY     • SINUS SURGERY       Family History   Problem Relation Age of Onset   • Atrial fibrillation Mother    • Hypertension Mother    • Atrial fibrillation Father    • Heart disease Brother    • Diabetes Brother    • No Known Problems Daughter    • No Known Problems Daughter    • Anxiety disorder Paternal Grandmother    • Breast cancer Maternal Aunt         in her 50;s   • Colon cancer Neg Hx      Social History     Tobacco Use   • Smoking status: Never   • Smokeless tobacco: Never   Vaping Use   • Vaping status: Never Used   Substance and Sexual Activity   • Alcohol use: Yes     Comment: socially   • Drug use: No   • Sexual activity: Not on file     Current Outpatient Medications on File Prior to Visit   Medication Sig   • ALPRAZolam (XANAX) 0.25 mg tablet Take 1 tablet (0.25 mg total) by mouth daily at bedtime as needed for anxiety   • busPIRone (BUSPAR) 5 mg tablet TAKE 1 TABLET BY MOUTH TWICE A DAY   • Calcium-Cholecalciferol-Zinc 650-20-5.5 MG-MCG-MG CHEW Chew   • propranolol (INDERAL) 10 mg tablet Take 1 tablet (10 mg total) by mouth daily at bedtime     Allergies   Allergen Reactions   • Latex Rash   • Wound Dressing Adhesive Rash     Immunization History   Administered Date(s) Administered   • COVID-19 PFIZER VACCINE 0.3 ML IM 04/13/2021, 05/04/2021, 01/03/2022   • INFLUENZA 10/19/2020   • Tdap 11/28/2018     Objective     /70   Pulse 78   Resp 16   Ht 5' 9\" (1.753 m)   Wt 74.8 kg " (165 lb)   SpO2 98%   BMI 24.37 kg/m²     Physical Exam  Vitals and nursing note reviewed.   Constitutional:       Appearance: Normal appearance.   Cardiovascular:      Rate and Rhythm: Normal rate.      Heart sounds: No murmur heard.  Pulmonary:      Effort: Pulmonary effort is normal.      Breath sounds: No wheezing.   Musculoskeletal:         General: No swelling or tenderness.      Cervical back: Normal range of motion.      Comments: Painful flexion of the left thumb   Skin:     Findings: No bruising or erythema.       Administrative Statements

## 2024-06-25 ENCOUNTER — APPOINTMENT (OUTPATIENT)
Dept: LAB | Facility: CLINIC | Age: 69
End: 2024-06-25
Payer: MEDICARE

## 2024-06-25 DIAGNOSIS — F41.9 ANXIETY: ICD-10-CM

## 2024-06-25 DIAGNOSIS — M79.642 HAND PAIN, LEFT: ICD-10-CM

## 2024-06-25 DIAGNOSIS — M79.645 THUMB PAIN, LEFT: ICD-10-CM

## 2024-06-25 LAB — URATE SERPL-MCNC: 3.4 MG/DL (ref 2–7.5)

## 2024-06-25 PROCEDURE — 36415 COLL VENOUS BLD VENIPUNCTURE: CPT

## 2024-06-25 PROCEDURE — 85027 COMPLETE CBC AUTOMATED: CPT

## 2024-06-25 PROCEDURE — 84550 ASSAY OF BLOOD/URIC ACID: CPT

## 2024-06-25 RX ORDER — BUSPIRONE HYDROCHLORIDE 5 MG/1
5 TABLET ORAL 2 TIMES DAILY
Qty: 180 TABLET | Refills: 5 | Status: SHIPPED | OUTPATIENT
Start: 2024-06-25

## 2024-06-25 NOTE — TELEPHONE ENCOUNTER
Reason for call:   [x] Refill   [] Prior Auth  [] Other:     Office:   [x] PCP/Provider -   [] Specialty/Provider -     Medication: busPIRone (BUSPAR) 5 mg tablet TAKE 1 TABLET BY MOUTH TWICE A DAY       Pharmacy: [facility] - [place] - [address]     Does the patient have enough for 3 days?   [] Yes   [x] No - Send as HP to POD

## 2024-06-26 ENCOUNTER — TELEPHONE (OUTPATIENT)
Dept: FAMILY MEDICINE CLINIC | Facility: CLINIC | Age: 69
End: 2024-06-26

## 2024-06-26 NOTE — TELEPHONE ENCOUNTER
----- Message from Irish Galvan MD sent at 6/26/2024 10:31 AM EDT -----  Normal uric acid ,please inform the patient

## 2024-07-10 ENCOUNTER — HOSPITAL ENCOUNTER (OUTPATIENT)
Dept: NON INVASIVE DIAGNOSTICS | Facility: HOSPITAL | Age: 69
Discharge: HOME | End: 2024-07-10
Attending: INTERNAL MEDICINE
Payer: MEDICARE

## 2024-07-10 VITALS — BODY MASS INDEX: 24.44 KG/M2 | HEIGHT: 69 IN | WEIGHT: 165 LBS

## 2024-07-10 DIAGNOSIS — R94.31 ABNORMAL EKG: ICD-10-CM

## 2024-07-10 LAB
ARRHY DURING EX: NORMAL
CHEST PAIN STATEMENT: NORMAL
MAX DIASTOLIC BP: 90 MMHG
MAX PREDICTED HEART RATE: 153 BPM
PROTOCOL NAME: NORMAL
REASON FOR TERMINATION: NORMAL
STRESS POST EXERCISE DUR MIN: 8 MIN
STRESS POST EXERCISE DUR SEC: 5 SEC
STRESS POST PEAK HR: 164 BPM
STRESS POST PEAK SYSTOLIC BP: 180 MMHG
TARGET HR FORMULA: NORMAL
TEST INDICATION: NORMAL

## 2024-07-10 PROCEDURE — 93018 CV STRESS TEST I&R ONLY: CPT | Performed by: INTERNAL MEDICINE

## 2024-07-10 PROCEDURE — 93016 CV STRESS TEST SUPVJ ONLY: CPT | Performed by: INTERNAL MEDICINE

## 2024-07-10 PROCEDURE — 93017 CV STRESS TEST TRACING ONLY: CPT

## 2024-07-11 ENCOUNTER — TELEPHONE (OUTPATIENT)
Age: 69
End: 2024-07-11

## 2024-07-11 LAB
MAX HR PERCENT: 107 %
MAX HR: 164 BPM
RATE PRESSURE PRODUCT: NORMAL
SL CV STRESS RECOVERY BP: NORMAL MMHG
SL CV STRESS RECOVERY HR: 91 BPM
SL CV STRESS RECOVERY O2 SAT: 98 %
SL CV STRESS STAGE REACHED: 3
STRESS ANGINA INDEX: 0
STRESS BASELINE BP: NORMAL MMHG
STRESS BASELINE HR: 85 BPM
STRESS O2 SAT REST: 97 %
STRESS PEAK HR: 162 BPM
STRESS POST ESTIMATED WORKLOAD: 10.1 METS
STRESS POST EXERCISE DUR MIN: 8 MIN
STRESS POST EXERCISE DUR SEC: 5 SEC
STRESS POST O2 SAT PEAK: 98 %
STRESS POST PEAK BP: 180 MMHG

## 2024-07-11 NOTE — TELEPHONE ENCOUNTER
"Spoke with patient, relayed Dr Bansal's message regarding results of stress test:    \"Please let patient know her stress test was normal. No changes are needed.\"    Verbalized understanding, no questions asked.  "

## 2024-07-11 NOTE — TELEPHONE ENCOUNTER
Caller: Ibeth Rowley ( Patient)     Doctor: Shamir     Reason for call: Pt would like a call back with results from stress test that was done yesterday.     Call back#: [phone]

## 2024-08-06 ENCOUNTER — HOSPITAL ENCOUNTER (OUTPATIENT)
Dept: RADIOLOGY | Facility: MEDICAL CENTER | Age: 69
Discharge: HOME | End: 2024-08-06
Payer: MEDICARE

## 2024-08-06 VITALS — HEIGHT: 69 IN | BODY MASS INDEX: 24.44 KG/M2 | WEIGHT: 165 LBS

## 2024-08-06 DIAGNOSIS — Z12.31 ENCOUNTER FOR SCREENING MAMMOGRAM FOR BREAST CANCER: ICD-10-CM

## 2024-08-06 PROCEDURE — 77063 BREAST TOMOSYNTHESIS BI: CPT

## 2024-08-06 PROCEDURE — 77067 SCR MAMMO BI INCL CAD: CPT

## 2024-08-14 ENCOUNTER — TELEPHONE (OUTPATIENT)
Dept: FAMILY MEDICINE CLINIC | Facility: CLINIC | Age: 69
End: 2024-08-14

## 2024-08-14 NOTE — TELEPHONE ENCOUNTER
----- Message from Alan MONTANEZ sent at 8/13/2024  6:50 PM EDT -----  Please call patient and let her know mammo normal, repeat 1 year

## 2024-10-02 DIAGNOSIS — F41.9 ANXIETY: ICD-10-CM

## 2024-10-02 RX ORDER — PROPRANOLOL HCL 10 MG
10 TABLET ORAL
Qty: 90 TABLET | Refills: 1 | Status: SHIPPED | OUTPATIENT
Start: 2024-10-02

## 2024-10-02 NOTE — TELEPHONE ENCOUNTER
Reason for call:   [x] Refill   [] Prior Auth  [] Other:     Office:   [x] PCP/Provider -  FP FORKS  Authorized By: ZEKE Ferrer  [] Specialty/Provider -     Medication: propranolol (INDERAL) 10 mg tablet     Dose/Frequency: Take 1 tablet (10 mg total) by mouth daily at bedtime     Quantity: 90 tablet     Pharmacy: [facility] - [facility] [address]     Does the patient have enough for 3 days?   [x] Yes   [] No - Send as HP to POD

## 2024-11-14 ENCOUNTER — NEW PATIENT COMPREHENSIVE (OUTPATIENT)
Dept: URBAN - METROPOLITAN AREA CLINIC 6 | Facility: CLINIC | Age: 69
End: 2024-11-14

## 2024-11-14 DIAGNOSIS — H43.811: ICD-10-CM

## 2024-11-14 DIAGNOSIS — H25.813: ICD-10-CM

## 2024-11-14 DIAGNOSIS — H04.123: ICD-10-CM

## 2024-11-14 PROCEDURE — 92004 COMPRE OPH EXAM NEW PT 1/>: CPT

## 2024-11-14 ASSESSMENT — VISUAL ACUITY
OD_GLARE: 20/80
OD_CC: 20/20
OS_CC: 20/300
OS_PH: 20/150
OS_GLARE: 20/400

## 2024-11-14 ASSESSMENT — TONOMETRY
OD_IOP_MMHG: 17
OS_IOP_MMHG: 18

## 2024-12-19 ENCOUNTER — PRE-OP CATARACT MEASUREMENTS (OUTPATIENT)
Dept: URBAN - METROPOLITAN AREA CLINIC 6 | Facility: CLINIC | Age: 69
End: 2024-12-19

## 2024-12-19 DIAGNOSIS — H25.813: ICD-10-CM

## 2024-12-19 PROCEDURE — 92012 INTRM OPH EXAM EST PATIENT: CPT

## 2024-12-19 PROCEDURE — 92136 OPHTHALMIC BIOMETRY: CPT

## 2024-12-19 ASSESSMENT — KERATOMETRY
OS_AXISANGLE2_DEGREES: 4
OS_K2POWER_DIOPTERS: 45.00
OD_AXISANGLE_DEGREES: 66
OD_K2POWER_DIOPTERS: 44.25
OS_AXISANGLE_DEGREES: 94
OD_K1POWER_DIOPTERS: 43.25
OS_K1POWER_DIOPTERS: 44.00
OD_AXISANGLE2_DEGREES: 156

## 2024-12-19 ASSESSMENT — VISUAL ACUITY
OS_CC: 20/350
OD_GLARE: 20/70
OD_CC: 20/25

## 2024-12-19 ASSESSMENT — TONOMETRY
OS_IOP_MMHG: 15
OD_IOP_MMHG: 14

## 2024-12-30 ENCOUNTER — CONSULT (OUTPATIENT)
Dept: FAMILY MEDICINE CLINIC | Facility: CLINIC | Age: 69
End: 2024-12-30
Payer: MEDICARE

## 2024-12-30 VITALS
DIASTOLIC BLOOD PRESSURE: 82 MMHG | HEIGHT: 69 IN | HEART RATE: 78 BPM | OXYGEN SATURATION: 98 % | SYSTOLIC BLOOD PRESSURE: 120 MMHG | WEIGHT: 165 LBS | BODY MASS INDEX: 24.44 KG/M2 | RESPIRATION RATE: 16 BRPM

## 2024-12-30 DIAGNOSIS — H53.8 BLURRY VISION: ICD-10-CM

## 2024-12-30 DIAGNOSIS — F41.9 ANXIETY: ICD-10-CM

## 2024-12-30 DIAGNOSIS — H25.9 AGE-RELATED CATARACT OF BOTH EYES, UNSPECIFIED AGE-RELATED CATARACT TYPE: ICD-10-CM

## 2024-12-30 DIAGNOSIS — I34.1 MVP (MITRAL VALVE PROLAPSE): ICD-10-CM

## 2024-12-30 DIAGNOSIS — Z01.818 PREOP EXAMINATION: Primary | ICD-10-CM

## 2024-12-30 DIAGNOSIS — Z00.00 ENCOUNTER FOR SCREENING AND PREVENTATIVE CARE: ICD-10-CM

## 2024-12-30 DIAGNOSIS — Z01.818 PREOPERATIVE CLEARANCE: ICD-10-CM

## 2024-12-30 DIAGNOSIS — H31.012 MACULAR SCAR OF LEFT EYE: ICD-10-CM

## 2024-12-30 PROCEDURE — 99214 OFFICE O/P EST MOD 30 MIN: CPT | Performed by: FAMILY MEDICINE

## 2024-12-30 NOTE — ASSESSMENT & PLAN NOTE
Patient previously had surgery to the macula of the left eye this year.  No severe complications from surgery or anesthesia.

## 2024-12-30 NOTE — PROGRESS NOTES
New Patient Outpatient Note    HPI:     Ibeth Rowley, 69 y.o. female  presents today as a new patient and to establish care.  Patient was previously seen by Adriana.  Overall the patient has a history of mitral valve prolapse which was recently evaluated by Dr. Bansal this past year.  She had a previous surgery for her left eye that did not have any significant complications.  She also notes history of anxiety and seasonal affective disorder.  She is currently on BuSpar 5 mg twice a day and propranolol 10 mg at night.  This has significantly improved her mental health, and has helped to limit/control her anxiety.  She has a history of Xanax use in the past, but due to recent medication changes she is not yet needed this as frequently.    Family Hx  UTD in chart    Past Medical History:   Diagnosis Date    Anxiety     Change in bowel movement 02/14/2020    Chronic RLQ pain 02/14/2020    Mitral prolapse     PTSD (post-traumatic stress disorder) 05/05/2022        Past Surgical History:   Procedure Laterality Date    CHOLECYSTECTOMY      HYSTERECTOMY      SINUS SURGERY            Current Outpatient Medications:     ALPRAZolam (XANAX) 0.25 mg tablet, Take 1 tablet (0.25 mg total) by mouth daily at bedtime as needed for anxiety, Disp: 30 tablet, Rfl: 0    busPIRone (BUSPAR) 5 mg tablet, Take 1 tablet (5 mg total) by mouth 2 (two) times a day, Disp: 180 tablet, Rfl: 5    Calcium-Cholecalciferol-Zinc 650-20-5.5 MG-MCG-MG CHEW, Chew, Disp: , Rfl:     diclofenac (VOLTAREN) 75 mg EC tablet, Take 1 tablet (75 mg total) by mouth 2 (two) times a day, Disp: 14 tablet, Rfl: 0    propranolol (INDERAL) 10 mg tablet, Take 1 tablet (10 mg total) by mouth daily at bedtime, Disp: 90 tablet, Rfl: 1     SOCIAL:   Rent/Own:  Own  Currently living with: Father  Stable food: Yes  Safe At Home: Yes  Hobbies:  jigsaw puzzles, walking  Profession/ employment: retired  Restriction to medical procedures:  None    SEXUAL HISTORY:   Preference:  did  not review  Sexually Active:  not currently  Birth Control:  NA    Psychological History  Psychiatric history: Anxiety  History of inpatient:  None  Current Therapy/ Provider:  None  Current Medications:  See medication list    Substance History  Smoking: None  Alcohol Use: 7-14 drinks per week  Substance use:  None      ROS:   Review of Systems   Constitutional:  Negative for chills, fever and unexpected weight change.   HENT:  Positive for ear pain (eye pressure, right) and postnasal drip (intermittent). Negative for congestion, ear discharge, rhinorrhea, sinus pressure, sinus pain and sore throat.    Eyes:  Positive for visual disturbance.   Respiratory:  Negative for cough, chest tightness, shortness of breath and wheezing.    Cardiovascular:  Negative for chest pain and palpitations.   Gastrointestinal:  Positive for diarrhea (occasional urgency). Negative for abdominal pain, constipation, nausea and vomiting.   Skin:  Negative for rash and wound.   Neurological:  Negative for dizziness, light-headedness and headaches.   Psychiatric/Behavioral:  Negative for self-injury and suicidal ideas.           OBJECTIVE  Vitals:    12/30/24 0759   BP: 120/82   Pulse: 78   Resp: 16   SpO2: 98%        Physical Exam   See pre op note      Assessment & Plan  Assessment & Plan  Preop examination  Patient presents today for preop examination but also establishes care with new provider.  Overall the patient is fairly healthy with a history of anxiety currently being treated by beta-blocker and BuSpar.  Overall she has been tolerating medications well and without significant side effects.  Additionally she has a history of mitral valve prolapse which she has been monitored by cardiology.  She had an echocardiogram and a stress test within the last year both of which showed mitral valve prolapse without a reduction in cardiac function.  Her stress test was negative for ischemia.  Since the patient has no significant symptoms and  on physical, patient's heart and lungs sound normal with no lower extremity swelling I we will clear patient for surgery.       Preoperative clearance  See preop examination       Blurry vision  Patient going for cataract removal bilaterally due to blurry vision.       Age-related cataract of both eyes, unspecified age-related cataract type  See blurry vision       Encounter for screening and preventative care  Patient establishing care.  Overall fairly healthy.  Has a history of anxiety as mentioned above.  Actively being treated with BuSpar and beta-blocker.  She will use Xanax intermittently.       Anxiety  See screening for preventative care       MVP (mitral valve prolapse)  See preop examination       Macular scar of left eye  Patient previously had surgery to the macula of the left eye this year.  No severe complications from surgery or anesthesia.        Spencer Quiles [facility]  12/30/2024 8:09 AM

## 2024-12-30 NOTE — PROGRESS NOTES
Pre-operative Clearance  Name: Ibeth Rowley      : 1955      MRN: [id number]  Encounter Provider: Spencer Quiles DO  Encounter Date: 2024   Encounter department: [facility]    Assessment & Plan  Preop examination  Patient presents today for preop examination but also establishes care with new provider.  Overall the patient is fairly healthy with a history of anxiety currently being treated by beta-blocker and BuSpar.  Overall she has been tolerating medications well and without significant side effects.  Additionally she has a history of mitral valve prolapse which she has been monitored by cardiology.  She had an echocardiogram and a stress test within the last year both of which showed mitral valve prolapse without a reduction in cardiac function.  Her stress test was negative for ischemia.  Since the patient has no significant symptoms and on physical, patient's heart and lungs sound normal with no lower extremity swelling I we will clear patient for surgery.       Preoperative clearance  See preop examination       Blurry vision  Patient going for cataract removal bilaterally due to blurry vision.       Age-related cataract of both eyes, unspecified age-related cataract type  See blurry vision       Encounter for screening and preventative care  Patient establishing care.  Overall fairly healthy.  Has a history of anxiety as mentioned above.  Actively being treated with BuSpar and beta-blocker.  She will use Xanax intermittently.       Anxiety  See screening for preventative care       MVP (mitral valve prolapse)  See preop examination       Macular scar of left eye  Patient previously had surgery to the macula of the left eye this year.  No severe complications from surgery or anesthesia.       Pre-operative Clearance:     Revised Cardiac Risk Index:  RCI RISK CLASS I (0 risk factors, risk of major cardiac complications approximately 0.5%)    Clearance:  Patient is  medically optimized (CLEARED) for proposed surgery without any additional cardiac testing.      Medication Instructions:   - Avoid herbs or non-directed vitamins one week prior to surgery    - Avoid aspirin containing medications or non-steroidal anti-inflammatory drugs one week preceding surgery    - May take tylenol for pain up until the night before surgery    - Benzodiazepines (ie, alprazolam, lorazepam, diazepam):  If the medication is needed, continue to take it on your normal schedule.  - Beta blockers:  Continue to take this medication on your normal schedule.  - Buspirone: Continue to take this medication on your normal schedule.      Depression Screening and Follow-up Plan: Patient was screened for depression during today's encounter. They screened negative with a PHQ-2 score of 0.      History of Present Illness     Pre-op Exam  Surgery: bilateral cataracts  Anticipated Date of Surgery: 01/07/2025, 01/21/2025  Surgeon: Garth    Patient presents today to establish care.  Please see other note for further details about past medical history and medication.  For her preop, she is looking to have bilateral cataract removal performed.  She has been having blurry vision in both eyes which has been limiting her vision.  She is interested in having these removed and excited to see how her vision improves.    Previous history of bleeding disorders or clots?: No  Previous Anesthesia reaction?: Yes  Prolonged steroid use in the last 6 months?: No    Assessment of Cardiac Risk:   - Unstable or severe angina or MI in the last 6 weeks or history of stent placement in the last year?: No   - Decompensated heart failure (e.g. New onset heart failure, NYHA  Class IV heart failure, or worsening existing heart failure)?: No  - Significant arrhythmias such as high grade AV block, symptomatic ventricular arrhythmia, newly recognized ventricular tachycardia, supraventricular tachycardia with resting heart rate >100, or  symptomatic bradycardia?: No  - Severe heart valve disease including aortic stenosis or symptomatic mitral stenosis?: No      Pre-operative Risk Factors:  Elevated-risk surgery: No    History of cerebrovascular disease: No    History of ischemic heart disease: No  Pre-operative treatment with insulin: No  Pre-operative creatinine >2 mg/dL: No    History of congestive heart failure: No    [facility] Activity Status Index (DASI):   DASI Total Score: 42.7  METs: 8    Medications of Perioperative Concern:   Beta blockers    Review of Systems   Constitutional:  Negative for chills, fever and unexpected weight change.   HENT:  Positive for ear pain, postnasal drip (intemirttent) and rhinorrhea (intermittent). Negative for congestion, ear discharge, hearing loss, sinus pressure, sinus pain and sore throat.    Eyes:  Positive for visual disturbance.   Respiratory:  Negative for cough, chest tightness, shortness of breath and wheezing.    Cardiovascular:  Negative for chest pain and palpitations.   Gastrointestinal:  Negative for abdominal pain, constipation, diarrhea, nausea and vomiting.   Genitourinary:  Negative for dysuria.   Skin:  Negative for rash and wound.   Neurological:  Negative for dizziness, light-headedness and headaches.   Psychiatric/Behavioral:  Negative for self-injury and suicidal ideas.      Past Medical History   Past Medical History:   Diagnosis Date    Anxiety     Change in bowel movement 02/14/2020    Chronic RLQ pain 02/14/2020    Mitral prolapse     PTSD (post-traumatic stress disorder) 05/05/2022     Past Surgical History:   Procedure Laterality Date    CHOLECYSTECTOMY      EYE SURGERY      macula left side    HYSTERECTOMY      SINUS SURGERY      VEIN LIGATION AND STRIPPING       Family History   Problem Relation Age of Onset    Atrial fibrillation Mother     Hypertension Mother     Atrial fibrillation Father     Heart disease Brother     Diabetes Brother     No Known Problems Daughter     No Known  "Problems Daughter     Anxiety disorder Paternal Grandmother     Breast cancer Maternal Aunt         in her 50;s    Colon cancer Neg Hx     Uterine cancer Neg Hx     Cervical cancer Neg Hx     Ovarian cancer Neg Hx      Social History     Tobacco Use    Smoking status: Never    Smokeless tobacco: Never   Vaping Use    Vaping status: Never Used   Substance and Sexual Activity    Alcohol use: Yes     Comment: 7-14 week    Drug use: No    Sexual activity: Not Currently     Current Outpatient Medications on File Prior to Visit   Medication Sig    ALPRAZolam (XANAX) 0.25 mg tablet Take 1 tablet (0.25 mg total) by mouth daily at bedtime as needed for anxiety    busPIRone (BUSPAR) 5 mg tablet Take 1 tablet (5 mg total) by mouth 2 (two) times a day    Calcium-Cholecalciferol-Zinc 650-20-5.5 MG-MCG-MG CHEW Chew    propranolol (INDERAL) 10 mg tablet Take 1 tablet (10 mg total) by mouth daily at bedtime    [DISCONTINUED] diclofenac (VOLTAREN) 75 mg EC tablet Take 1 tablet (75 mg total) by mouth 2 (two) times a day     Allergies   Allergen Reactions    Latex Rash    Wound Dressing Adhesive Rash     Objective   /82   Pulse 78   Resp 16   Ht 5' 9\" (1.753 m)   Wt 74.8 kg (165 lb)   SpO2 98%   BMI 24.37 kg/m²     Physical Exam  Constitutional:       General: She is not in acute distress.     Appearance: Normal appearance. She is normal weight. She is not ill-appearing, toxic-appearing or diaphoretic.   HENT:      Head: Normocephalic and atraumatic.      Ears:      Comments: Nonocclusive cerumen, difficult examination      Nose: Nose normal. No congestion or rhinorrhea.      Mouth/Throat:      Mouth: Mucous membranes are moist.      Pharynx: Oropharynx is clear. No oropharyngeal exudate or posterior oropharyngeal erythema.   Eyes:      General:         Right eye: No discharge.         Left eye: No discharge.      Extraocular Movements: Extraocular movements intact.      Pupils: Pupils are equal, round, and reactive to " light.   Neck:      Vascular: No carotid bruit.   Cardiovascular:      Rate and Rhythm: Normal rate and regular rhythm.      Heart sounds: Normal heart sounds. No murmur heard.     No friction rub. No gallop.   Pulmonary:      Effort: Pulmonary effort is normal. No respiratory distress.      Breath sounds: Normal breath sounds. No stridor. No wheezing, rhonchi or rales.   Abdominal:      General: Bowel sounds are normal. There is no distension.      Palpations: Abdomen is soft.      Tenderness: There is no abdominal tenderness.   Musculoskeletal:      Cervical back: Neck supple. No tenderness.   Lymphadenopathy:      Cervical: No cervical adenopathy.   Skin:     General: Skin is warm.      Capillary Refill: Capillary refill takes less than 2 seconds.   Neurological:      Mental Status: She is alert.      Cranial Nerves: No cranial nerve deficit (Cranial nerve II-XII grossly intact).      Sensory: No sensory deficit (Light touch present all 4 extremities).      Motor: No weakness (5/5 in all 4 extremities).      Deep Tendon Reflexes: Reflexes normal (2/4, intact, C5, C6, L4, S1).           Spencer Quiles, DO

## 2025-01-08 ENCOUNTER — 1 DAY POST-OP (OUTPATIENT)
Dept: URBAN - METROPOLITAN AREA CLINIC 6 | Facility: CLINIC | Age: 70
End: 2025-01-08

## 2025-01-08 DIAGNOSIS — H25.811: ICD-10-CM

## 2025-01-08 DIAGNOSIS — Z96.1: ICD-10-CM

## 2025-01-08 PROCEDURE — 92136 OPHTHALMIC BIOMETRY: CPT | Mod: 26,RT

## 2025-01-08 PROCEDURE — 99024 POSTOP FOLLOW-UP VISIT: CPT

## 2025-01-08 ASSESSMENT — VISUAL ACUITY
OS_SC: 20/150
OD_CC: 20/25

## 2025-01-08 ASSESSMENT — KERATOMETRY
OS_K2POWER_DIOPTERS: 45.00
OS_K1POWER_DIOPTERS: 44.00
OD_K1POWER_DIOPTERS: 43.25
OD_AXISANGLE_DEGREES: 66
OS_AXISANGLE2_DEGREES: 4
OD_AXISANGLE2_DEGREES: 156
OD_K2POWER_DIOPTERS: 44.25
OS_AXISANGLE_DEGREES: 94

## 2025-01-08 ASSESSMENT — TONOMETRY
OS_IOP_MMHG: 15
OD_IOP_MMHG: 15

## 2025-01-16 ENCOUNTER — 1 WEEK POST-OP (OUTPATIENT)
Dept: URBAN - METROPOLITAN AREA CLINIC 6 | Facility: CLINIC | Age: 70
End: 2025-01-16

## 2025-01-16 DIAGNOSIS — Z96.1: ICD-10-CM

## 2025-01-16 PROCEDURE — 99024 POSTOP FOLLOW-UP VISIT: CPT

## 2025-01-16 ASSESSMENT — KERATOMETRY
OD_AXISANGLE_DEGREES: 66
OS_AXISANGLE2_DEGREES: 4
OS_AXISANGLE_DEGREES: 94
OS_K2POWER_DIOPTERS: 45.00
OS_K1POWER_DIOPTERS: 44.00
OD_K1POWER_DIOPTERS: 43.25
OD_AXISANGLE2_DEGREES: 156
OD_K2POWER_DIOPTERS: 44.25

## 2025-01-16 ASSESSMENT — VISUAL ACUITY
OD_CC: 20/30+2
OS_SC: 20/80
OU_CC: J1+

## 2025-01-16 ASSESSMENT — TONOMETRY
OS_IOP_MMHG: 11
OD_IOP_MMHG: 15

## 2025-01-22 ENCOUNTER — 1 DAY POST-OP (OUTPATIENT)
Dept: URBAN - METROPOLITAN AREA CLINIC 6 | Facility: CLINIC | Age: 70
End: 2025-01-22

## 2025-01-22 DIAGNOSIS — Z96.1: ICD-10-CM

## 2025-01-22 PROCEDURE — 99024 POSTOP FOLLOW-UP VISIT: CPT

## 2025-01-22 ASSESSMENT — VISUAL ACUITY
OS_SC: 20/150-1
OS_PH: 20/100
OS_SC: J16
OD_SC: 20/30-2
OD_SC: J3

## 2025-01-22 ASSESSMENT — TONOMETRY
OS_IOP_MMHG: 14
OD_IOP_MMHG: 16

## 2025-01-22 ASSESSMENT — KERATOMETRY
OD_K2POWER_DIOPTERS: 44.25
OS_AXISANGLE_DEGREES: 94
OS_K1POWER_DIOPTERS: 44.00
OS_AXISANGLE2_DEGREES: 4
OD_K1POWER_DIOPTERS: 43.25
OS_K2POWER_DIOPTERS: 45.00
OD_AXISANGLE_DEGREES: 66
OD_AXISANGLE2_DEGREES: 156

## 2025-04-10 ENCOUNTER — TELEPHONE (OUTPATIENT)
Dept: FAMILY MEDICINE CLINIC | Facility: CLINIC | Age: 70
End: 2025-04-10

## 2025-04-10 ENCOUNTER — OFFICE VISIT (OUTPATIENT)
Dept: URGENT CARE | Facility: CLINIC | Age: 70
End: 2025-04-10
Payer: MEDICARE

## 2025-04-10 VITALS
OXYGEN SATURATION: 97 % | SYSTOLIC BLOOD PRESSURE: 144 MMHG | HEART RATE: 76 BPM | DIASTOLIC BLOOD PRESSURE: 82 MMHG | TEMPERATURE: 98 F | RESPIRATION RATE: 18 BRPM

## 2025-04-10 DIAGNOSIS — M25.552 ACUTE HIP PAIN, LEFT: ICD-10-CM

## 2025-04-10 DIAGNOSIS — S76.012A STRAIN OF LEFT HIP, INITIAL ENCOUNTER: Primary | ICD-10-CM

## 2025-04-10 PROCEDURE — G0463 HOSPITAL OUTPT CLINIC VISIT: HCPCS

## 2025-04-10 PROCEDURE — 99213 OFFICE O/P EST LOW 20 MIN: CPT

## 2025-04-10 NOTE — PROGRESS NOTES
[facility]        NAME: Ibeth Rowley is a 69 y.o. female  : 1955    MRN: [id number]  DATE: April 15, 2025  TIME: 4:05 PM    Assessment and Plan   Strain of left hip, initial encounter [S76.012A]  1. Strain of left hip, initial encounter  tiZANidine (ZANAFLEX) 4 mg tablet      2. Acute hip pain, left              Patient Instructions   Zanaflex as needed for muscle spasms, this medication may make your drowsy-do not drive or operate heavy machinery     Ice or heat    Apply ice to help decrease swelling and pain.  Use an ice pack, or put crushed ice in a plastic bag.   Cover it with a towel before you place it on your hip.  Apply ice for 15 to 20 minutes every hour, or as directed.    Apply heat to help decrease pain and muscle spasms.   Use a heat pack or a heating pad set on low heat.   Apply heat on your hip for 20 to 30 minutes every 2 hours for as many days as directed.    Stretching as tolerated  OTC Tylenol/Ibuprofen as needed    Other treatment options:  May use OTC Tart Cherry capsule or powder 1000 mg once per day  May use OTC Tumeric 500 mg capsule two times per day  CBD balm/ointment  OTC Biofreeze or Lidoderm patches    Patient may need further imaging or if symptoms do not resolve consider referral to Ortho.     Follow up with PCP in 3-5 days.  Proceed to ER if symptoms worsen.    If tests are performed, our office will contact you with results only if changes need to made to the care plan discussed with you at the visit. [facility]    Chief Complaint     Chief Complaint   Patient presents with   • Hip Pain     Pt reports attending an exercise class early this afternoon, states shortly after started with left hip pain that radiates into groin and around to back, states hx of sciatica in the back. Took Ibuprofen around noon with some relief but states getting stiffer.     History of Present Illness       Hip Pain   The incident occurred 3 to 6  "hours ago. The incident occurred at home. There was no injury mechanism. The pain is present in the left hip. Quality: \"stiff\" Pertinent negatives include no inability to bear weight, loss of motion, loss of sensation, muscle weakness, numbness or tingling. She reports no foreign bodies present. The symptoms are aggravated by movement. She has tried NSAIDs for the symptoms. The treatment provided moderate relief.       Review of Systems   Review of Systems   Constitutional:  Negative for chills, diaphoresis and fever.   Respiratory: Negative.  Negative for cough, shortness of breath and wheezing.    Cardiovascular: Negative.  Negative for chest pain and palpitations.   Musculoskeletal:  Positive for myalgias (L). Negative for arthralgias, gait problem and joint swelling.   Skin:  Negative for color change, rash and wound.   Neurological:  Negative for tingling and numbness.     Current Medications       Current Outpatient Medications:   •  tiZANidine (ZANAFLEX) 4 mg tablet, Take 1 tablet (4 mg total) by mouth every 8 (eight) hours as needed for muscle spasms, Disp: 30 tablet, Rfl: 0  •  ALPRAZolam (XANAX) 0.25 mg tablet, Take 1 tablet (0.25 mg total) by mouth daily at bedtime as needed for anxiety, Disp: 30 tablet, Rfl: 0  •  busPIRone (BUSPAR) 5 mg tablet, Take 1 tablet (5 mg total) by mouth 2 (two) times a day, Disp: 180 tablet, Rfl: 5  •  Calcium-Cholecalciferol-Zinc 650-20-5.5 MG-MCG-MG CHEW, Chew, Disp: , Rfl:   •  propranolol (INDERAL) 10 mg tablet, Take 1 tablet (10 mg total) by mouth daily at bedtime, Disp: 90 tablet, Rfl: 1    Current Allergies     Allergies as of 04/10/2025 - Reviewed 04/10/2025   Allergen Reaction Noted   • Latex Rash 08/01/2022   • Wound dressing adhesive Rash 08/01/2022            The following portions of the patient's history were reviewed and updated as appropriate: allergies, current medications, past family history, past medical history, past social history, past surgical history " and problem list.     Past Medical History:   Diagnosis Date   • Anxiety    • Change in bowel movement 02/14/2020   • Chronic RLQ pain 02/14/2020   • Mitral prolapse    • PTSD (post-traumatic stress disorder) 05/05/2022       Past Surgical History:   Procedure Laterality Date   • CHOLECYSTECTOMY     • EYE SURGERY      macula left side   • HYSTERECTOMY     • SINUS SURGERY     • VEIN LIGATION AND STRIPPING         Family History   Problem Relation Age of Onset   • Atrial fibrillation Mother    • Hypertension Mother    • Atrial fibrillation Father    • Heart disease Brother    • Diabetes Brother    • No Known Problems Daughter    • No Known Problems Daughter    • Anxiety disorder Paternal Grandmother    • Breast cancer Maternal Aunt         in her 50;s   • Colon cancer Neg Hx    • Uterine cancer Neg Hx    • Cervical cancer Neg Hx    • Ovarian cancer Neg Hx          Medications have been verified.        Objective   /82 (BP Location: Right arm) Comment: Manual  Pulse 76   Temp 98 °F (36.7 °C)   Resp 18   SpO2 97%        Physical Exam     Physical Exam  Vitals and nursing note reviewed.   Constitutional:       General: She is not in acute distress.     Appearance: Normal appearance. She is not ill-appearing, toxic-appearing or diaphoretic.   HENT:      Head: Normocephalic.   Cardiovascular:      Rate and Rhythm: Normal rate and regular rhythm.      Pulses: Normal pulses.      Heart sounds: Normal heart sounds. No murmur heard.  Pulmonary:      Effort: Pulmonary effort is normal. No respiratory distress.      Breath sounds: Normal breath sounds. No stridor. No wheezing, rhonchi or rales.   Chest:      Chest wall: No tenderness.   Musculoskeletal:         General: No swelling, tenderness, deformity or signs of injury.      Left hip: No deformity, tenderness, bony tenderness or crepitus. Normal range of motion. Normal strength.   Skin:     General: Skin is warm.      Findings: No bruising or erythema.    Neurological:      Mental Status: She is alert.

## 2025-04-10 NOTE — TELEPHONE ENCOUNTER
Ibeth called in to request an appointment for left hip pain. I explained PCP was unavailable and there were no openings with other providers. She was advised urgent care and requested a follow up to be scheduled with PCP. She would like to know if there could be a sooner appointment than 4/29. She will be going to Urgent Care today to be evaluated.

## 2025-04-10 NOTE — PATIENT INSTRUCTIONS
Zanaflex as needed for muscle spasms, this medication may make your drowsy-do not drive or operate heavy machinery     Ice or heat    Apply ice to help decrease swelling and pain.  Use an ice pack, or put crushed ice in a plastic bag.   Cover it with a towel before you place it on your hip.  Apply ice for 15 to 20 minutes every hour, or as directed.    Apply heat to help decrease pain and muscle spasms.   Use a heat pack or a heating pad set on low heat.   Apply heat on your hip for 20 to 30 minutes every 2 hours for as many days as directed.    Stretching as tolerated  OTC Tylenol/Ibuprofen as needed    Other treatment options:  May use OTC Tart Cherry capsule or powder 1000 mg once per day  May use OTC Tumeric 500 mg capsule two times per day  CBD balm/ointment  OTC Biofreeze or Lidoderm patches    Patient may need further imaging or if symptoms do not resolve consider referral to Ortho.     Follow up with PCP in 3-5 days.  Proceed to ER if symptoms worsen.    If tests are performed, our office will contact you with results only if changes need to made to the care plan discussed with you at the visit. You can review your full results on [facility].

## 2025-04-13 ENCOUNTER — TELEPHONE (OUTPATIENT)
Dept: OTHER | Facility: OTHER | Age: 70
End: 2025-04-13

## 2025-04-13 NOTE — TELEPHONE ENCOUNTER
Patient is calling regarding cancelling an appointment.    Date/Time: 4- @ 09:00 am    Patient was rescheduled: YES [] NO [x]    Patient requesting call back to reschedule: YES [] NO [x]

## 2025-05-11 ENCOUNTER — OFFICE VISIT (OUTPATIENT)
Dept: URGENT CARE | Facility: CLINIC | Age: 70
End: 2025-05-11
Attending: GENERAL ACUTE CARE HOSPITAL
Payer: MEDICARE

## 2025-05-11 VITALS
RESPIRATION RATE: 16 BRPM | OXYGEN SATURATION: 98 % | TEMPERATURE: 97.5 F | DIASTOLIC BLOOD PRESSURE: 82 MMHG | WEIGHT: 165 LBS | HEART RATE: 100 BPM | SYSTOLIC BLOOD PRESSURE: 126 MMHG | HEIGHT: 69 IN | BODY MASS INDEX: 24.44 KG/M2

## 2025-05-11 DIAGNOSIS — B34.9 VIRAL ILLNESS: Primary | ICD-10-CM

## 2025-05-11 PROCEDURE — 87636 SARSCOV2 & INF A&B AMP PRB: CPT | Performed by: NURSE PRACTITIONER

## 2025-05-11 PROCEDURE — 99213 OFFICE O/P EST LOW 20 MIN: CPT | Performed by: NURSE PRACTITIONER

## 2025-05-11 PROCEDURE — G0463 HOSPITAL OUTPT CLINIC VISIT: HCPCS | Performed by: NURSE PRACTITIONER

## 2025-05-11 RX ORDER — PSYLLIUM HUSK 0.4 G
CAPSULE ORAL
COMMUNITY

## 2025-05-11 NOTE — PROGRESS NOTES
[facility]        NAME: Ibeth Rowley is a 69 y.o. female  : 1955    MRN: [id number]  DATE: May 11, 2025  TIME: 10:14 AM    Assessment and Plan   Viral illness [B34.9]  1. Viral illness  Covid/Flu- Office Collect Normal        COVID and flu testing collected as per patient request.  She does not have a MyChart.  Advised that we will call her with positive results.  She may call the office to inquire about results.  Phone number provided.  Advised to use over-the-counter Mucinex as needed.    Patient Instructions   Robitussin, delsym or Mucinex for cough  Increase fluid intake   Tylenol/Motrin as needed for pain or fever  Rest  Follow up with your PCP for worsening symptoms    Follow up with PCP in 3-5 days.  Proceed to  ER if symptoms worsen.    Chief Complaint     Chief Complaint   Patient presents with    Cold Like Symptoms     URI s/s x 3 days         History of Present Illness       Patient is a 69-year-old female presenting with 3 days of cough and congestion.  She reports increased mucus production.  She did have low-grade temperatures of 100.4.  Associated with scratchy throat.  Denies ear pain.  No over-the-counter medications attempted.  She is requesting COVID and flu testing.        Review of Systems   Review of Systems   Constitutional:  Positive for fever. Negative for activity change and chills.   HENT:  Positive for congestion and rhinorrhea. Negative for ear pain.    Respiratory:  Positive for cough.          Current Medications       Current Outpatient Medications:     ALPRAZolam (XANAX) 0.25 mg tablet, Take 1 tablet (0.25 mg total) by mouth daily at bedtime as needed for anxiety, Disp: 30 tablet, Rfl: 0    busPIRone (BUSPAR) 5 mg tablet, Take 1 tablet (5 mg total) by mouth 2 (two) times a day, Disp: 180 tablet, Rfl: 5    Calcium Carb-Cholecalciferol (Calcium 1000 + D) 1000-20 MG-MCG TABS, Take by mouth, Disp: , Rfl:     Calcium-Cholecalciferol-Zinc 650-20-5.5 MG-MCG-MG CHEW, Chew,  "Disp: , Rfl:     propranolol (INDERAL) 10 mg tablet, Take 1 tablet (10 mg total) by mouth daily at bedtime, Disp: 90 tablet, Rfl: 1    tiZANidine (ZANAFLEX) 4 mg tablet, Take 1 tablet (4 mg total) by mouth every 8 (eight) hours as needed for muscle spasms, Disp: 30 tablet, Rfl: 0    Current Allergies     Allergies as of 05/11/2025 - Reviewed 05/11/2025   Allergen Reaction Noted    Latex Rash 08/01/2022    Wound dressing adhesive Rash 08/01/2022            The following portions of the patient's history were reviewed and updated as appropriate: allergies, current medications, past family history, past medical history, past social history, past surgical history and problem list.     Past Medical History:   Diagnosis Date    Anxiety     Change in bowel movement 02/14/2020    Chronic RLQ pain 02/14/2020    Mitral prolapse     PTSD (post-traumatic stress disorder) 05/05/2022       Past Surgical History:   Procedure Laterality Date    CHOLECYSTECTOMY      EYE SURGERY      macula left side    HYSTERECTOMY      SINUS SURGERY      VEIN LIGATION AND STRIPPING         Family History   Problem Relation Age of Onset    Atrial fibrillation Mother     Hypertension Mother     Atrial fibrillation Father     Heart disease Brother     Diabetes Brother     No Known Problems Daughter     No Known Problems Daughter     Anxiety disorder Paternal Grandmother     Breast cancer Maternal Aunt         in her 50;s    Colon cancer Neg Hx     Uterine cancer Neg Hx     Cervical cancer Neg Hx     Ovarian cancer Neg Hx          Medications have been verified.        Objective   /82   Pulse 100   Temp 97.5 °F (36.4 °C)   Resp 16   Ht 5' 9\" (1.753 m)   Wt 74.8 kg (165 lb)   SpO2 98%   BMI 24.37 kg/m²        Physical Exam     Physical Exam  Vitals reviewed.   Constitutional:       General: She is awake. She is not in acute distress.     Appearance: Normal appearance. She is normal weight.   HENT:      Head: Normocephalic.      Right Ear: " Hearing, tympanic membrane, ear canal and external ear normal.      Left Ear: Hearing, tympanic membrane, ear canal and external ear normal.      Nose: Nose normal.      Mouth/Throat:      Lips: Pink.      Pharynx: Posterior oropharyngeal erythema present.   Cardiovascular:      Rate and Rhythm: Normal rate and regular rhythm.      Heart sounds: Normal heart sounds, S1 normal and S2 normal.   Pulmonary:      Effort: Pulmonary effort is normal.      Breath sounds: Normal breath sounds. No decreased breath sounds, wheezing or rhonchi.   Skin:     General: Skin is warm and moist.   Neurological:      General: No focal deficit present.      Mental Status: She is alert and oriented to person, place, and time.   Psychiatric:         Behavior: Behavior is cooperative.

## 2025-05-11 NOTE — PATIENT INSTRUCTIONS
"Robitussin, delsym or Mucinex for cough  Increase fluid intake   Tylenol/Motrin as needed for pain or fever  Rest  Follow up with your PCP for worsening symptoms    Patient Education     Upper respiratory infection in adults - Discharge instructions   The Basics   Written by the doctors and editors at [facility], [place]   What are discharge instructions? -- Discharge instructions are information about how to take care of yourself after getting medical care for a health problem.  What is an upper respiratory infection? -- An upper respiratory infection (\"URI\") is an illness that can affect your nose, throat, ears, and sinuses. Almost all URIs are caused by a virus. The common cold is an example of a viral URI. Some URIs are caused by bacteria, but this is much less common.  URIs spread easily from person to person, most often through coughing or sneezing. A URI will almost always get better in a week or 2 without any treatment. Because most URIs are caused by viruses, antibiotics do not usually help.  If you do have a bacterial infection, your doctor might prescribe antibiotics.  How do I care for myself at home? -- Ask the doctor or nurse what you should do when you go home. Make sure that you understand exactly what you need to do to care for yourself. Ask questions if there is anything you do not understand.  You should also:   Wash your hands often (figure 1), and cough or sneeze into a tissue. If you do not have a tissue, cough or sneeze into your elbow instead of your hands.   Drink lots of fluids (water, juice, or broth) to stay hydrated, unless your doctor told you otherwise. This will help replace any fluids lost through runny nose or fever. Warm tea or soup can also help soothe a sore throat.   To help a stuffy nose and make it easier to breathe:   Use saline nose drops or spray.   Use a humidifier if the air in your home feels dry.   Follow the directions on the label carefully if you take over-the-counter cough or " cold medicines. Do not take more than 1 medicine that contains acetaminophen. Also, if you have a heart problem or high blood pressure, check with your doctor before you take any of these medicines.   Try to quit smoking if you smoke. Your doctor or nurse can help.  How can I prevent getting another URI? -- The best way to prevent a URI, or keep it from spreading to others, is to keep your hands clean. Wash your hands often with soap and water or alcohol gel rubs.  Some other ways to prevent the spread of infection include:   Always wash your hands with soap and water after you cough, sneeze, or blow your nose.   Clean surfaces and objects that you touch a lot. These include sinks, counters, tables, door handles, remotes, and phones. Use a bleach and water mixture. The germs that cause a URI can live on surfaces for at least 2 hours.   Do not share cups, food, towels, bed linens, or other personal items.   Stay away from other people when you are sick. When you do need to be around other people, consider wearing a face mask.  When should I call the doctor? -- Call for advice if:   You have a persistent fever of 100.4°F (38°C) or higher, chills, a very bad sore throat, or ear or sinus pain.   You get a new fever after several days of feeling the same or getting better.   You start having chest pain when you cough.   You have a cough that lasts more than 10 days.   You cough up blood.   You have any new or worsening symptoms, such as worsening cough or trouble breathing.  All topics are updated as new evidence becomes available and our peer review process is complete.  This topic retrieved from [employer] on: Mar 13, 2024.  Topic 976963 Version 1.0  Release: 32.2.4 - C32.71  © 2024 UpToDate, Inc. and/or its affiliates. All rights reserved.  figure 1: How to wash your hands     Wet your hands with clean water, and apply a small amount of soap. Lather and rub hands together for at least 20 seconds. Clean your wrists,  palms, backs of your hands, between your fingers, tips of your fingers, thumbs, and under and around your nails. Rinse well, and dry your hands using a clean towel.  Graphic 715673 Version 7.0  Consumer Information Use and Disclaimer   Disclaimer: This generalized information is a limited summary of diagnosis, treatment, and/or medication information. It is not meant to be comprehensive and should be used as a tool to help the user understand and/or assess potential diagnostic and treatment options. It does NOT include all information about conditions, treatments, medications, side effects, or risks that may apply to a specific patient. It is not intended to be medical advice or a substitute for the medical advice, diagnosis, or treatment of a health care provider based on the health care provider's examination and assessment of a patient's specific and unique circumstances. Patients must speak with a health care provider for complete information about their health, medical questions, and treatment options, including any risks or benefits regarding use of medications. This information does not endorse any treatments or medications as safe, effective, or approved for treating a specific patient. UpToDate, Inc. and its affiliates disclaim any warranty or liability relating to this information or the use thereof.The use of this information is governed by the Terms of Use, available at https://www.wolterskluwer.com/en/know/clinical-effectiveness-terms. 2024© UpToDate, Inc. and its affiliates and/or licensors. All rights reserved.  Copyright   © 2024 UpToDate, Inc. and/or its affiliates. All rights reserved.

## 2025-05-12 ENCOUNTER — RESULTS FOLLOW-UP (OUTPATIENT)
Dept: URGENT CARE | Facility: CLINIC | Age: 70
End: 2025-05-12

## 2025-05-12 LAB
FLUAV RNA RESP QL NAA+PROBE: NEGATIVE
FLUBV RNA RESP QL NAA+PROBE: NEGATIVE
SARS-COV-2 RNA RESP QL NAA+PROBE: NEGATIVE

## 2025-05-19 DIAGNOSIS — F41.9 ANXIETY: ICD-10-CM

## 2025-05-19 RX ORDER — PROPRANOLOL HYDROCHLORIDE 10 MG/1
10 TABLET ORAL
Qty: 90 TABLET | Refills: 1 | Status: SHIPPED | OUTPATIENT
Start: 2025-05-19

## 2025-05-22 ENCOUNTER — OFFICE VISIT (OUTPATIENT)
Dept: CARDIOLOGY CLINIC | Facility: CLINIC | Age: 70
End: 2025-05-22
Payer: MEDICARE

## 2025-05-22 VITALS
BODY MASS INDEX: 23.85 KG/M2 | HEART RATE: 86 BPM | DIASTOLIC BLOOD PRESSURE: 78 MMHG | OXYGEN SATURATION: 98 % | WEIGHT: 161 LBS | SYSTOLIC BLOOD PRESSURE: 120 MMHG | HEIGHT: 69 IN | TEMPERATURE: 97.7 F

## 2025-05-22 DIAGNOSIS — I34.1 MVP (MITRAL VALVE PROLAPSE): Primary | ICD-10-CM

## 2025-05-22 DIAGNOSIS — E78.5 DYSLIPIDEMIA: ICD-10-CM

## 2025-05-22 DIAGNOSIS — R00.2 PALPITATIONS: ICD-10-CM

## 2025-05-22 DIAGNOSIS — R94.31 ABNORMAL EKG: ICD-10-CM

## 2025-05-22 PROCEDURE — 99214 OFFICE O/P EST MOD 30 MIN: CPT | Performed by: INTERNAL MEDICINE

## 2025-05-22 PROCEDURE — 93000 ELECTROCARDIOGRAM COMPLETE: CPT | Performed by: INTERNAL MEDICINE

## 2025-05-22 NOTE — ASSESSMENT & PLAN NOTE
Identified remotely. More recent testing with echocardiogram in June 2024 showed mild to moderate mitral regurgitation with prolapse of the posterior leaflet. Repeat echo ordered for next year. No significant murmur heard on auscultation. Denies any symptoms. EF is preserved. Blood pressure well-controlled without any pharmacologic agents for this.  
23-Nov-2020 18:20

## 2025-05-22 NOTE — PROGRESS NOTES
Cardiology Follow Up     Ibeth Rowley  [id number]  1955  [facility] [place]  [address] [id number] [phone]      1. MVP (mitral valve prolapse)  POCT ECG    Echo complete w/ contrast if indicated      2. Palpitations          Assessment & Plan  MVP (mitral valve prolapse)  Identified remotely. More recent testing with echocardiogram in June 2024 showed mild to moderate mitral regurgitation with prolapse of the posterior leaflet. Repeat echo ordered for next year. No significant murmur heard on auscultation. Denies any symptoms. EF is preserved. Blood pressure well-controlled without any pharmacologic agents for this.  Palpitations  PVC noted on ECG today. She takes low-dose propranolol at night which seems to help with palpitations and anxiety.  Dyslipidemia  Lipid panel overdue. This has been ordered and she has follow-up with her primary care physician for her annual Medicare wellness check. Current 10-year ASCVD risk is 6.8%. Lifestyle modification. Initiation of lipid-lowering therapy if there is been a significant change in her lipid panel.  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 Previous exercise treadmill test without any evidence of ischemia        History of Present Illness:    70-year-old female with a history of mitral valve prolapse and abnormal EKG done in preoperative evaluation prior to an eye surgery showed abnormality suggestive of an old inferior infarct.    Patient says she was diagnosed with mitral valve prolapse about 20 years ago. She was feeling palpitations and saw cardiologist who did different maneuvers with auscultation and may have heard a murmur. She was told of mitral valve prolapse but did not have a repeat echo until June 2022 which showed posterior leaflet prolapse and mild to moderate mitral regurgitation.  She does not do any formal exercise, but she walks  occasionally does not feel any symptoms. No heart failure symptoms.    She has macular degeneration, had surgery on her left eye. An EKG at her PCPs office showed old inferior infarct pattern. Patient does not have any chest pain. There is a family history of coronary disease. Her oldest brother had strokelike symptoms in his mid 40s and was found to have carotid disease. He also had coronary artery disease identified in his 50s with an MI. He has poor lifestyle. No other family members have early ASCVD or death. Stress test subsequently negative.    Patient does have borderline cholesterol as well as impaired fasting glucose.    Interval History:  No significant changes since last visit with me. She is not feeling any shortness of breath. She had a lingering URI symptoms. She is currently dealing with vertigo and has a appointment with both her primary care physician's office as well as physical therapy for vestibular rehab scheduled for next week.    No edema, chest pain. No repeat cardiac testing was necessary. She did have her stress test done last summer which reviewed again was without any evidence of ischemia.    Continues with propranolol at night for palpitations and anxiety    Patient Active Problem List   Diagnosis    Need for Tdap vaccination    Anxiety    MVP (mitral valve prolapse)    Ganglion cyst of dorsum of right wrist    Varicose veins of both lower extremities    History of hysterectomy for benign disease    Encounter for screening mammogram for breast cancer    Positive colorectal cancer screening using Cologuard test    Preoperative clearance    Macular scar of left eye    Thumb pain, left     Past Medical History:   Diagnosis Date    Anxiety     Change in bowel movement 02/14/2020    Chronic RLQ pain 02/14/2020    Mitral prolapse     PTSD (post-traumatic stress disorder) 05/05/2022     Social History     Tobacco Use    Smoking status: Never    Smokeless tobacco: Never   Vaping Use    Vaping  "status: Never Used   Substance Use Topics    Alcohol use: Yes     Comment: 7-14 week    Drug use: No      Family History   Problem Relation Name Age of Onset    Atrial fibrillation Mother      Hypertension Mother      Atrial fibrillation Father      Heart disease Brother      Diabetes Brother      No Known Problems Daughter radha     No Known Problems Daughter ha     Anxiety disorder Paternal Grandmother      Breast cancer Maternal Aunt          in her 50;s    Colon cancer Neg Hx      Uterine cancer Neg Hx      Cervical cancer Neg Hx      Ovarian cancer Neg Hx       Past Surgical History:   Procedure Laterality Date    CHOLECYSTECTOMY      EYE SURGERY      macula left side    HYSTERECTOMY      SINUS SURGERY      VEIN LIGATION AND STRIPPING         Current Outpatient Medications:     ALPRAZolam (XANAX) 0.25 mg tablet, Take 1 tablet (0.25 mg total) by mouth daily at bedtime as needed for anxiety, Disp: 30 tablet, Rfl: 0    busPIRone (BUSPAR) 5 mg tablet, Take 1 tablet (5 mg total) by mouth 2 (two) times a day, Disp: 180 tablet, Rfl: 5    Calcium Carb-Cholecalciferol (Calcium 1000 + D) 1000-20 MG-MCG TABS, Take by mouth, Disp: , Rfl:     propranolol (INDERAL) 10 mg tablet, Take 1 tablet (10 mg total) by mouth daily at bedtime, Disp: 90 tablet, Rfl: 1    Calcium-Cholecalciferol-Zinc 650-20-5.5 MG-MCG-MG CHEW, Chew (Patient not taking: Reported on 5/22/2025), Disp: , Rfl:     tiZANidine (ZANAFLEX) 4 mg tablet, Take 1 tablet (4 mg total) by mouth every 8 (eight) hours as needed for muscle spasms (Patient not taking: Reported on 5/22/2025), Disp: 30 tablet, Rfl: 0  Allergies   Allergen Reactions    Latex Rash    Wound Dressing Adhesive Rash       Vitals:    05/22/25 0941   BP: 120/78   BP Location: Left arm   Patient Position: Sitting   Cuff Size: Adult   Pulse: 86   Temp: 97.7 °F (36.5 °C)   TempSrc: Tympanic   SpO2: 98%   Weight: 73 kg (161 lb)   Height: 5' 9\" (1.753 m)     Vitals:    05/22/25 0941   Weight: 73 kg " "(161 lb)      Height: 5' 9\" (175.3 cm)   Body mass index is 23.78 kg/m².    Physical Exam:  GEN: Ibeth Rowley appears well, alert and oriented x 3, pleasant and cooperative   HEENT: pupils equal, round, and reactive to light; extraocular muscles intact  NECK: supple, no carotid bruits   HEART: regular rhythm, normal S1 and S2, no murmurs, clicks, gallops or rubs   LUNGS: clear to auscultation bilaterally; no wheezes, rales, or rhonchi   ABDOMEN: normal bowel sounds, soft, no tenderness, no distention  EXTREMITIES: peripheral pulses normal; no clubbing, cyanosis, or edema  NEURO: no focal findings   SKIN: normal without suspicious lesions on exposed skin    ROS:  Positive for vision changes, ocular migraines, anxiety, palpitations.  Except as noted in HPI, is otherwise reviewed in detail and a 12 point review of systems is negative.  ROS reviewed and is unchanged    Labs:  Lab Results   Component Value Date    SODIUM 139 03/11/2024    K 4.0 03/11/2024     03/11/2024    CREATININE 0.66 03/11/2024    BUN 11 03/11/2024    CO2 28 03/11/2024    ALT 12 03/11/2024    AST 16 03/11/2024    GLUF 100 (H) 03/11/2024    WBC 7.44 06/25/2024    HGB 13.2 06/25/2024    HCT 39.8 06/25/2024     06/25/2024       No results found for: \"CHOL\"  Lab Results   Component Value Date    HDL 63 03/11/2024    HDL 51 06/15/2022     Lab Results   Component Value Date    LDLCALC 126 (H) 03/11/2024    LDLCALC 124 (H) 06/15/2022     Lab Results   Component Value Date    TRIG 175 (H) 03/11/2024    TRIG 128 06/15/2022     Testing:  ETT 7/2024  Stress ECG: No ST deviation is noted. The ECG was negative for ischemia. The stress ECG is negative for ischemia after maximal exercise, without reproduction of symptoms.     Normal exercise treadmill test.    Echo 6/2024  Left Ventricle: Left ventricular cavity size is normal. Wall thickness is normal. The left ventricular ejection fraction is 60%. Systolic function is normal. Wall motion is " normal. Diastolic function is normal.    Mitral Valve: The posterior leaflet is prolapsed. There is mild to moderate regurgitation with an anteriorly directed jet.    Tricuspid Valve: There is mild regurgitation.    Prior TTE study available for comparison. Prior study date: 6/15/2022. No significant changes noted compared to the prior study.    Echo 6/2022  Left Ventricle: Left ventricular cavity size is normal. Wall thickness is normal. The left ventricular ejection fraction is 55%. Systolic function is normal. Wall motion is normal. Diastolic function is normal for age.    Right Ventricle: Right ventricular cavity size is normal. Systolic function is normal.    Mitral Valve: The posterior leaflet is mild-moderately prolapsed in late systole. There is mild to moderate regurgitation.    Tricuspid Valve: There is trace regurgitation. The right ventricular systolic pressure is normal. The estimated right ventricular systolic pressure is 22 mmHg.    EKG:  Sinus rhythm. 86 bpm. Left axis deviation, old inferior infarct pattern. PVC. No change compared to prior.

## 2025-05-27 ENCOUNTER — OFFICE VISIT (OUTPATIENT)
Dept: FAMILY MEDICINE CLINIC | Facility: CLINIC | Age: 70
End: 2025-05-27
Payer: MEDICARE

## 2025-05-27 VITALS
OXYGEN SATURATION: 98 % | WEIGHT: 162 LBS | HEIGHT: 69 IN | DIASTOLIC BLOOD PRESSURE: 80 MMHG | BODY MASS INDEX: 23.99 KG/M2 | SYSTOLIC BLOOD PRESSURE: 135 MMHG | HEART RATE: 56 BPM

## 2025-05-27 DIAGNOSIS — H69.93 DYSFUNCTION OF BOTH EUSTACHIAN TUBES: ICD-10-CM

## 2025-05-27 DIAGNOSIS — H61.23 BILATERAL IMPACTED CERUMEN: ICD-10-CM

## 2025-05-27 DIAGNOSIS — H81.11 BPPV (BENIGN PAROXYSMAL POSITIONAL VERTIGO), RIGHT: Primary | ICD-10-CM

## 2025-05-27 PROBLEM — L57.8 OTHER SKIN CHANGES DUE TO CHRONIC EXPOSURE TO NONIONIZING RADIATION: Status: ACTIVE | Noted: 2019-04-08

## 2025-05-27 PROCEDURE — 99214 OFFICE O/P EST MOD 30 MIN: CPT

## 2025-05-27 PROCEDURE — 69210 REMOVE IMPACTED EAR WAX UNI: CPT

## 2025-05-27 RX ORDER — MECLIZINE HYDROCHLORIDE 25 MG/1
12.5 TABLET ORAL EVERY 8 HOURS PRN
Qty: 30 TABLET | Refills: 0 | Status: SHIPPED | OUTPATIENT
Start: 2025-05-27

## 2025-05-27 RX ORDER — FLUTICASONE PROPIONATE 50 MCG
1 SPRAY, SUSPENSION (ML) NASAL DAILY
Qty: 9.9 ML | Refills: 0 | Status: SHIPPED | OUTPATIENT
Start: 2025-05-27

## 2025-05-27 NOTE — PROGRESS NOTES
Name: Ibeth Rowley      : 1955      MRN: [id number]  Encounter Provider: ZEKE Freeman  Encounter Date: 2025   Encounter department: [facility]  :  Assessment & Plan  BPPV (benign paroxysmal positional vertigo), right  PE consistent with BPPV - rx for PT and meclizine as directed. Advised to take meclizine at night as it may cause drowsiness. Can do zyrtec/claritin during the day. Patient will follow-up with PT - has appointment scheduled for tomorrow but needs referral.   Orders:    meclizine (ANTIVERT) 25 mg tablet; Take 0.5 tablets (12.5 mg total) by mouth every 8 (eight) hours as needed for dizziness    Ambulatory Referral to Physical Therapy; Future    Bilateral impacted cerumen  Cerumen removed from both ears with irrigation - some cerumen remains, stopped procedure due to position discomfort/dizziness. Will trial debrox OTC for additional symptom relief.   Orders:    Ear cerumen removal    Dysfunction of both eustachian tubes  Flonase as directed to clear eustachian tubes. Educated on OTC oral anti-histamines for additional relief.   Orders:    fluticasone (FLONASE) 50 mcg/act nasal spray; 1 spray into each nostril daily           History of Present Illness   70 year old female presents for dizziness x 1 month. She reports she believes she has a h/o seasonal allergies and has had issues with vertigo in the past. She also has a h/o MVP but denies CP or dizziness. She was seen by cardiologist last week. EKG in cardiology office reveled some PVCS - was advised to take propanolol prn. She has an outstanding echo order that she did not yet complete. She denies CP or palpitations. She reports the vertigo is occasional, seems to be worse at night when lying and with positional movements to her head towards the right ear. She has not tried any interventions for symptoms. She did call PT and inquire about vertigo therapy but they recommended she receive a referral.   "    Dizziness  This is a new problem. The current episode started more than 1 month ago. The problem occurs constantly. The problem has been unchanged. Associated symptoms include congestion and vertigo. Pertinent negatives include no abdominal pain, anorexia, arthralgias, change in bowel habit, chest pain, chills, coughing, diaphoresis, fatigue, fever, headaches, joint swelling, myalgias, nausea, neck pain, numbness, rash, sore throat, swollen glands, urinary symptoms, visual change, vomiting or weakness. The symptoms are aggravated by exertion and twisting. She has tried nothing for the symptoms. The treatment provided no relief.     Review of Systems   Constitutional:  Negative for activity change, appetite change, chills, diaphoresis, fatigue and fever.   HENT:  Positive for congestion and rhinorrhea. Negative for ear pain, postnasal drip, sinus pressure, sinus pain, sneezing, sore throat and trouble swallowing.    Eyes:  Negative for pain and visual disturbance.   Respiratory:  Negative for cough and shortness of breath.    Cardiovascular:  Negative for chest pain and palpitations.   Gastrointestinal:  Negative for abdominal pain, anorexia, change in bowel habit, constipation, diarrhea, nausea and vomiting.   Genitourinary:  Negative for dysuria and hematuria.   Musculoskeletal:  Negative for arthralgias, back pain, joint swelling, myalgias and neck pain.   Skin:  Negative for color change and rash.   Allergic/Immunologic: Positive for environmental allergies. Negative for food allergies.   Neurological:  Positive for dizziness and vertigo. Negative for seizures, syncope, weakness, light-headedness, numbness and headaches.   All other systems reviewed and are negative.      Objective   /80   Pulse 56   Ht 5' 9\" (1.753 m)   Wt 73.5 kg (162 lb)   SpO2 98%   BMI 23.92 kg/m²      Physical Exam  Vitals and nursing note reviewed.   Constitutional:       General: She is awake. She is not in acute " distress.     Appearance: Normal appearance. She is well-developed and normal weight.   HENT:      Head: Normocephalic and atraumatic.      Right Ear: Hearing and external ear normal. There is impacted cerumen.      Left Ear: Hearing and external ear normal. There is impacted cerumen.      Ears:      Comments: Unable to visualize TM on both sides due to cerumen impaction      Nose: No congestion or rhinorrhea.      Mouth/Throat:      Lips: Pink.      Mouth: Mucous membranes are moist.      Pharynx: Oropharynx is clear. Uvula midline.     Eyes:      General: Lids are normal. Vision grossly intact.      Extraocular Movements: Extraocular movements intact.      Conjunctiva/sclera: Conjunctivae normal.       Cardiovascular:      Rate and Rhythm: Normal rate and regular rhythm.      Heart sounds: No murmur heard.  Pulmonary:      Effort: Pulmonary effort is normal. No respiratory distress.      Breath sounds: Normal breath sounds.   Abdominal:      Palpations: Abdomen is soft.      Tenderness: There is no abdominal tenderness.     Musculoskeletal:         General: No swelling.      Cervical back: Neck supple.     Skin:     General: Skin is warm and dry.      Capillary Refill: Capillary refill takes less than 2 seconds.     Neurological:      General: No focal deficit present.      Mental Status: She is alert and oriented to person, place, and time.      GCS: GCS eye subscore is 4. GCS verbal subscore is 5. GCS motor subscore is 6.      Cranial Nerves: Cranial nerves 2-12 are intact.      Sensory: Sensation is intact.      Motor: Motor function is intact.      Coordination: Coordination is intact.      Gait: Gait is intact.     Psychiatric:         Mood and Affect: Mood normal.         Behavior: Behavior normal. Behavior is cooperative.         Thought Content: Thought content normal.         Judgment: Judgment normal.       Ear cerumen removal    Date/Time: 5/27/2025 8:30 AM    Performed by: Bridgett Andrade  ZEKE  Authorized by: ZEKE Freeman    Universal Protocol:  procedure performed by consultantConsent: Verbal consent obtained  Risks and benefits: risks, benefits and alternatives were discussed  Consent given by: patient  Patient understanding: patient states understanding of the procedure being performed  Patient consent: the patient's understanding of the procedure matches consent given  Required items: required blood products, implants, devices, and special equipment available  Patient identity confirmed: verbally with patient    Patient location:  Clinic  Procedure details:     Location:  Both ears    Procedure type: irrigation with instrumentation      Instrumentation: curette      Approach:  External  Post-procedure details:     Complication:  None    Hearing quality:  Improved    Patient tolerance of procedure:  Tolerated with difficulty  Comments:      Some cerumen remains - recommend use of debrox to soften wax and re-evaluation in 1-2 weeks. Mid ear effusion visualized on left side upon cerumen removal. Unable to visualize TM of right side.

## 2025-05-28 ENCOUNTER — OFFICE VISIT (OUTPATIENT)
Dept: PHYSICAL THERAPY | Facility: CLINIC | Age: 70
End: 2025-05-28
Attending: FAMILY MEDICINE
Payer: MEDICARE

## 2025-05-28 DIAGNOSIS — H81.11 BPPV (BENIGN PAROXYSMAL POSITIONAL VERTIGO), RIGHT: Primary | ICD-10-CM

## 2025-05-28 PROCEDURE — 97140 MANUAL THERAPY 1/> REGIONS: CPT | Performed by: PHYSICAL THERAPIST

## 2025-05-28 PROCEDURE — 97162 PT EVAL MOD COMPLEX 30 MIN: CPT | Performed by: PHYSICAL THERAPIST

## 2025-05-28 NOTE — PROGRESS NOTES
PT Evaluation     Today's date: 2025  Patient name: Ibeth Rowley  : 1955  MRN: [id number]  Referring provider: Spencer Quiles, *  Dx:   Encounter Diagnosis     ICD-10-CM    1. BPPV (benign paroxysmal positional vertigo), right  H81.11                      Assessment  Impairments: activity intolerance and impaired balance    Assessment details: Signs and symptoms are consistent with BPPV due to symptoms of room spinning and a positive anival hallpike test.  The patient has issues with laying down, rolling over and looking up and this is limiting the patient's ability to complete ADLs.  The patient was successfully treated with an Epely maneuver and she tolerated this well.  She is returning in a few days for a follow up and possible discharge depending on her symptoms.     Understanding of Dx/Px/POC: excellent     Prognosis: excellent    Goals  STG : (1 week) - The patient is able to follow precautions post treatment to prevent a reoccurence of symptoms.  (1 week) - Decrease patient's symptoms by 50%.      LTG: (2 weeks) - The patient is able to return to sleeping and rolling over in bed without symptoms.  (2 weeks) - Abolish all dizziness symptoms.      Plan  Patient would benefit from: skilled physical therapy    Planned therapy interventions: therapeutic exercise, therapeutic activities, neuromuscular re-education and manual therapy    Frequency: 1x week  Duration in weeks: 4        Subjective Evaluation    History of Present Illness  Date of onset: 2025  Mechanism of injury: The patient reports that for past 4-5 weeks that she has been getting dizzy symptoms.  She states that most of her symptoms is when she roles over in bed.  She believes that when she roles to the right is when she gets her symptoms.  The patient also got symptoms when she was on a ladder and looking up to clean the ceiling fans.  The patient had a really bad episode 30 years ago but it kind of went away on its own.  She  reports a family history of her mother getting vertigo and needing the Epley maneuver.    Patient Goals  Patient goal: The patient wants to get rid of her dizziness.  Pain  Current pain ratin  At worst pain ratin  Location: Dizziness          Objective   Neuro Exam:     Oculomotor exam   Oculomotor ROM: WNL  Resting nystagmus: not present   Gaze holding nystagmus: not present left  and not present right  Smooth pursuits: within normal limits  Vertical saccades: normal  Horizontal saccades: normal  Convergence: normal and Slight dizziness  Head thrust: left normal and right normal    Positional testing   Julia-Hallpike   Left posterior canal: WNL  Right posterior canal: symptomatic             Precautions:        POC Expires Auth Status Start Date Expiration Date PT Visit Limit           Date        Used 1       Remaining           Diagnosis:    Precautions:    Manuals        Epley Maneuver R, AB                                       There Ex                                                                                Neuro Re-Ed                                                                                                                 Ther Act                                         Modalities

## 2025-06-02 ENCOUNTER — OFFICE VISIT (OUTPATIENT)
Dept: PHYSICAL THERAPY | Facility: CLINIC | Age: 70
End: 2025-06-02
Attending: FAMILY MEDICINE
Payer: MEDICARE

## 2025-06-02 DIAGNOSIS — H81.11 BPPV (BENIGN PAROXYSMAL POSITIONAL VERTIGO), RIGHT: Primary | ICD-10-CM

## 2025-06-02 PROCEDURE — 97140 MANUAL THERAPY 1/> REGIONS: CPT

## 2025-06-02 NOTE — PROGRESS NOTES
"Daily Note     Today's date: 2025  Patient name: Ibeth Rowley  : 1955  MRN: [id number]  Referring provider: Spencer Quiles, *  Dx:   Encounter Diagnosis     ICD-10-CM    1. BPPV (benign paroxysmal positional vertigo), right  H81.11           Start Time: 1045  Stop Time: 1110  Total time in clinic (min): 25 minutes    Subjective: Patient reports having no vertigo/episodes of dizziness since her last session, and \"feels pretty good\" overall.      Objective: See treatment diary below      Assessment: Tolerated treatment well. Initiated session with VOR x 1 and saccade testing which was WNL and did not cause any exacerbated vertigo sxs or any episodes of nystagmus t/o. Followed this with re-testing positional testing was normal and did not result in any episodes of BPPV. Patient did not test positive on any of the noted vestibular testings noted below and has been progressing well with improvements noted in the few sessions attended, therefore is ready to discharge to [facility] as per primary PT. Provided patient with a handout of self Epley testing for home use if sxs become exacerbated again. Patient verbalized understanding.     Plan: Discharge as per primary PT.     Precautions:        POC Expires Auth Status Start Date Expiration Date PT Visit Limit           Date        Used 1       Remaining           Diagnosis:    Precautions:    Manuals       Epley Maneuver R, AB R, AB                                      There Ex                                                                                Neuro Re-Ed        VOR x1        Saccades                                                                                                 Ther Act                                         Modalities                                                               "

## 2025-06-25 ENCOUNTER — RA CDI HCC (OUTPATIENT)
Dept: OTHER | Facility: HOSPITAL | Age: 70
End: 2025-06-25

## 2025-07-02 ENCOUNTER — OFFICE VISIT (OUTPATIENT)
Dept: FAMILY MEDICINE CLINIC | Facility: CLINIC | Age: 70
End: 2025-07-02
Payer: MEDICARE

## 2025-07-02 VITALS
HEIGHT: 69 IN | OXYGEN SATURATION: 98 % | DIASTOLIC BLOOD PRESSURE: 80 MMHG | BODY MASS INDEX: 23.99 KG/M2 | SYSTOLIC BLOOD PRESSURE: 122 MMHG | WEIGHT: 162 LBS | HEART RATE: 82 BPM

## 2025-07-02 DIAGNOSIS — Z13.0 SCREENING FOR DEFICIENCY ANEMIA: ICD-10-CM

## 2025-07-02 DIAGNOSIS — R73.01 ELEVATED FASTING BLOOD SUGAR: ICD-10-CM

## 2025-07-02 DIAGNOSIS — Z13.1 SCREENING FOR DIABETES MELLITUS: ICD-10-CM

## 2025-07-02 DIAGNOSIS — F41.9 ANXIETY: ICD-10-CM

## 2025-07-02 DIAGNOSIS — Z12.31 ENCOUNTER FOR SCREENING MAMMOGRAM FOR MALIGNANT NEOPLASM OF BREAST: Primary | ICD-10-CM

## 2025-07-02 DIAGNOSIS — E78.2 MIXED HYPERLIPIDEMIA: ICD-10-CM

## 2025-07-02 DIAGNOSIS — Z00.00 ENCOUNTER FOR SCREENING AND PREVENTATIVE CARE: ICD-10-CM

## 2025-07-02 DIAGNOSIS — M85.80 OSTEOPENIA, UNSPECIFIED LOCATION: ICD-10-CM

## 2025-07-02 DIAGNOSIS — Z78.0 POST-MENOPAUSAL: ICD-10-CM

## 2025-07-02 DIAGNOSIS — Z13.220 SCREENING CHOLESTEROL LEVEL: ICD-10-CM

## 2025-07-02 PROBLEM — Z01.818 PREOPERATIVE CLEARANCE: Status: RESOLVED | Noted: 2024-05-15 | Resolved: 2025-07-02

## 2025-07-02 PROBLEM — Z23 NEED FOR TDAP VACCINATION: Status: RESOLVED | Noted: 2018-11-28 | Resolved: 2025-07-02

## 2025-07-02 PROCEDURE — G2211 COMPLEX E/M VISIT ADD ON: HCPCS | Performed by: FAMILY MEDICINE

## 2025-07-02 PROCEDURE — G0439 PPPS, SUBSEQ VISIT: HCPCS | Performed by: FAMILY MEDICINE

## 2025-07-02 PROCEDURE — 99214 OFFICE O/P EST MOD 30 MIN: CPT | Performed by: FAMILY MEDICINE

## 2025-07-02 RX ORDER — BUSPIRONE HYDROCHLORIDE 5 MG/1
5 TABLET ORAL 2 TIMES DAILY
Qty: 180 TABLET | Refills: 5 | Status: SHIPPED | OUTPATIENT
Start: 2025-07-02

## 2025-07-02 RX ORDER — PROPRANOLOL HYDROCHLORIDE 10 MG/1
10 TABLET ORAL
Qty: 90 TABLET | Refills: 1 | Status: SHIPPED | OUTPATIENT
Start: 2025-07-02

## 2025-07-02 NOTE — ASSESSMENT & PLAN NOTE
Patient has history of tachycardia at night and anxiety.  She is well controlled with current dosing of buspar 5mg BID and propranolol 10mg at night.  She does not feel that any changes are required at this time.  She will reach out if there is any new or worsening symptoms.  Orders:    busPIRone (BUSPAR) 5 mg tablet; Take 1 tablet (5 mg total) by mouth 2 (two) times a day    propranolol (INDERAL) 10 mg tablet; Take 1 tablet (10 mg total) by mouth daily at bedtime

## 2025-07-02 NOTE — PROGRESS NOTES
Name: Ibeth Rowley      : 1955      MRN: [id number]  Encounter Provider: Spencer Quiles DO  Encounter Date: 2025   Encounter department: [facility]  :  Assessment & Plan  Encounter for screening mammogram for malignant neoplasm of breast  Patient requires follow up with mammogram.  She has a higher risk due to family history of breast cancer.  Therefore the patient will think about either obtaining imaging in /. I recommended this year if possible. Last obtained in 2024       Osteopenia, unspecified location  Post-menopausal  Patient has history of osteopenia.  Due to this we we will be able to follow-up with DEXA scan in 2025.  This is 3 years from her last.  She is currently taking vitamin D.  .  No history of osteoporosis in the past, therefore she is not on Fosamax or other osteoporosis medications such as Prolia  Orders:    DXA bone density spine hip and pelvis; Future    Anxiety  Patient has history of tachycardia at night and anxiety.  She is well controlled with current dosing of buspar 5mg BID and propranolol 10mg at night.  She does not feel that any changes are required at this time.  She will reach out if there is any new or worsening symptoms.  Orders:    busPIRone (BUSPAR) 5 mg tablet; Take 1 tablet (5 mg total) by mouth 2 (two) times a day    propranolol (INDERAL) 10 mg tablet; Take 1 tablet (10 mg total) by mouth daily at bedtime    Encounter for screening and preventative care  Screening cholesterol level  Screening for diabetes mellitus  Screening for deficiency anemia  Recommended follow-up labs since it has been over a year.  Patient to obtain labs reviewing cholesterol, kidney function, liver function, electrolytes, fasting sugar, A1c, and blood counts.  Orders:    Lipid panel; Future    CBC and differential; Future    Comprehensive metabolic panel; Future    Hemoglobin A1C; Future    Elevated fasting blood sugar  Patient has  history of elevated fasting sugar will obtain CMP and A1c since her last fasting sugar was 100.  Orders:    Comprehensive metabolic panel; Future    Hemoglobin A1C; Future    Mixed hyperlipidemia  History of mixed hyperlipidemia.  She is currently not on the medication.  She did have mild elevations in multiple categories including total cholesterol 224, triglycerides 175, and LDL at 126.  Her HDL was normal at 63.  Will follow-up with labs and determine if patient requires follow-up medication management with Zetia versus statin.          Preventive health issues were discussed with patient, and age appropriate screening tests were ordered as noted in patient's After Visit Summary. Personalized health advice and appropriate referrals for health education or preventive services given if needed, as noted in patient's After Visit Summary.    History of Present Illness     Patient presents today as new patient/transfer from [place].  Overall she has been doing well on her current medication regimen of BuSpar 5 mg twice a day and propranolol 10 mg nightly.  Both of these helped to control some of her anxiety and the racing heart rate that she had previously at night prior to bed.  She does admit to have being her dad at home who is about 91 and just went through a hip fracture.  It has been a little bit more stressful at home, but she is coping fairly well with the transition.       Patient Care Team:  Spencer Quiles DO as PCP - General (Family Medicine)  MD Conner Hernandez MD (Gastroenterology)    Review of Systems   Constitutional:  Negative for chills, fever and unexpected weight change.   HENT:  Negative for congestion, ear discharge, ear pain, hearing loss, postnasal drip, rhinorrhea, sinus pressure, sinus pain and sore throat.    Eyes:  Positive for visual disturbance (follows with optho, macula issue on left surgery,).   Respiratory:  Negative for cough, chest tightness and shortness of  breath.    Cardiovascular:  Negative for chest pain and palpitations.   Gastrointestinal:  Positive for abdominal distention (IBS?) and diarrhea (diet related, possible IBS). Negative for abdominal pain, constipation, nausea and vomiting.   Genitourinary:  Negative for dysuria and frequency.   Skin:  Negative for rash and wound.   Neurological:  Negative for dizziness, light-headedness and headaches.   Psychiatric/Behavioral:  Negative for self-injury and suicidal ideas.      Medical History Reviewed by provider this encounter:  Tobacco  Allergies  Meds  Problems  Med Hx  Surg Hx  Fam Hx  Soc   Hx      Annual Wellness Visit Questionnaire   Ibeth is here for her Initial Wellness visit.     Health Risk Assessment:   Patient rates overall health as very good. Patient feels that their physical health rating is same. Patient is satisfied with their life. Eyesight was rated as slightly better. Hearing was rated as same. Patient feels that their emotional and mental health rating is same. Patients states they are never, rarely angry. Patient states they are never, rarely unusually tired/fatigued. Pain experienced in the last 7 days has been none. Patient states that she has experienced no weight loss or gain in last 6 months.     Fall Risk Screening:   In the past year, patient has experienced: no history of falling in past year      Urinary Incontinence Screening:   Patient has not leaked urine accidently in the last six months.     Home Safety:  Patient does not have trouble with stairs inside or outside of their home. Patient has working smoke alarms and has working carbon monoxide detector. Home safety hazards include: none.     Nutrition:   Current diet is Regular.     Medications:   Patient is currently taking over-the-counter supplements. OTC medications include: see medication list. Patient is able to manage medications.     Activities of Daily Living (ADLs)/Instrumental Activities of Daily Living (IADLs):    Walk and transfer into and out of bed and chair?: Yes  Dress and groom yourself?: Yes    Bathe or shower yourself?: Yes    Feed yourself? Yes  Do your laundry/housekeeping?: Yes  Manage your money, pay your bills and track your expenses?: Yes  Make your own meals?: Yes    Do your own shopping?: Yes    Previous Hospitalizations:   Any hospitalizations or ED visits within the last 12 months?: No      Advance Care Planning:   Living will: Yes    Durable POA for healthcare: Yes    Advanced directive: Yes    Advanced directive counseling given: Yes    Five wishes given: No      Cognitive Screening:   Provider or family/friend/caregiver concerned regarding cognition?: No    Preventive Screenings      Cardiovascular Screening:    General: Screening Current      Diabetes Screening:     General: Risks and Benefits Discussed    Due for: Blood Glucose      Colorectal Cancer Screening:     General: Screening Current    Due for: Cologuard      Breast Cancer Screening:     General: Screening Current      Cervical Cancer Screening:    General: Screening Not Indicated      Osteoporosis Screening:    General: Screening Current      Abdominal Aortic Aneurysm (AAA) Screening:        General: Screening Not Indicated      Lung Cancer Screening:     General: Screening Not Indicated      Hepatitis C Screening:    General: Risks and Benefits Discussed and Patient Declines    Immunizations:  - Immunizations due: Zoster (Shingrix)    Screening, Brief Intervention, and Referral to Treatment (SBIRT)     Screening  Typical number of drinks in a day: 1  Typical number of drinks in a week: 14  Interpretation: Low risk drinking behavior.    AUDIT-C Screenin) How often did you have a drink containing alcohol in the past year? 4 or more times a week  2) How many drinks did you have on a typical day when you were drinking in the past year? 1 to 2  3) How often did you have 6 or more drinks on one occasion in the past year? never    AUDIT-C  Score: 4  Interpretation: Score 3-12 (female): POSITIVE screen for alcohol misuse    AUDIT Screenin) How often during the last year have you found that you were not able to stop drinking once you had started? 0 - never  5) How often during the last year have you failed to do what was normally expected from you because of drinking? 0 - never  6) How often during the last year have you needed a first drink in the morning to get yourself going after a heavy drinking session? 0 - never  7) How often during the last year have you had a feeling of guilt or remorse after drinking? 0 - never  8) How often during the last year have you been unable to remember what happened the night before because you had been drinking? 0 - never  9) Have you or someone else been injured as a result of your drinking? 0 - no  10) Has a relative or friend or a doctor or another health worker been concerned about your drinking or suggested you cut down? 0 - no    AUDIT Score: 4  Interpretation: Low risk alcohol consumption    Single Item Drug Screening:  How often have you used an illegal drug (including marijuana) or a prescription medication for non-medical reasons in the past year? never    Single Item Drug Screen Score: 0  Interpretation: Negative screen for possible drug use disorder    Brief Intervention  Alcohol & drug use screenings were reviewed. No concerns regarding substance use disorder identified.     Other Counseling Topics:   Car/seat belt/driving safety, skin self-exam, sunscreen and calcium and vitamin D intake and regular weightbearing exercise.     Social Drivers of Health     Financial Resource Strain: Low Risk  (2023)    Overall Financial Resource Strain ([facility])     Difficulty of Paying Living Expenses: Not hard at all   Food Insecurity: No Food Insecurity (2025)    Nursing - Inadequate Food Risk Classification     Worried About Running Out of Food in the Last Year: Never true     Ran Out of Food in the  "Last Year: Never true   Transportation Needs: No Transportation Needs (7/2/2025)    PRAPARE - Transportation     Lack of Transportation (Medical): No     Lack of Transportation (Non-Medical): No   Housing Stability: Unknown (7/2/2025)    Housing Stability Vital Sign     Unable to Pay for Housing in the Last Year: No     Homeless in the Last Year: No   Utilities: Not At Risk (7/2/2025)    [facility] Utilities     Threatened with loss of utilities: No     No results found.    Objective   /80   Pulse 82   Ht 5' 9\" (1.753 m)   Wt 73.5 kg (162 lb)   SpO2 98%   BMI 23.92 kg/m²     Physical Exam  Constitutional:       General: She is not in acute distress.     Appearance: Normal appearance. She is normal weight. She is not ill-appearing, toxic-appearing or diaphoretic.   HENT:      Head: Normocephalic and atraumatic.      Right Ear: Tympanic membrane, ear canal and external ear normal. There is no impacted cerumen.      Left Ear: Tympanic membrane, ear canal and external ear normal. There is no impacted cerumen.      Nose: Nose normal. No congestion or rhinorrhea.      Mouth/Throat:      Mouth: Mucous membranes are moist.      Pharynx: Oropharynx is clear. No oropharyngeal exudate or posterior oropharyngeal erythema.     Eyes:      General:         Right eye: No discharge.         Left eye: No discharge.      Extraocular Movements: Extraocular movements intact.      Pupils: Pupils are equal, round, and reactive to light.       Cardiovascular:      Rate and Rhythm: Normal rate and regular rhythm.      Heart sounds: Normal heart sounds. No murmur heard.     No friction rub. No gallop.   Pulmonary:      Effort: Pulmonary effort is normal. No respiratory distress.      Breath sounds: Normal breath sounds. No stridor. No wheezing, rhonchi or rales.   Abdominal:      General: There is no distension.      Palpations: Abdomen is soft.      Tenderness: There is no abdominal tenderness.     Skin:     General: Skin is warm.      " Capillary Refill: Capillary refill takes less than 2 seconds.     Neurological:      Mental Status: She is alert.      Sensory: No sensory deficit (light touch in all 4 extremities).      Motor: No weakness (5/5 in all 4 extremities).      Deep Tendon Reflexes: Reflexes normal (2/4, intact, C5, C6, L4, S1).

## 2025-08-06 ENCOUNTER — HOSPITAL ENCOUNTER (OUTPATIENT)
Facility: HOSPITAL | Age: 70
Discharge: HOME | End: 2025-08-06
Attending: FAMILY MEDICINE
Payer: MEDICARE

## 2025-08-06 VITALS — WEIGHT: 162 LBS | HEIGHT: 68 IN | BODY MASS INDEX: 24.55 KG/M2

## 2025-08-06 DIAGNOSIS — Z78.0 POST-MENOPAUSAL: ICD-10-CM

## 2025-08-06 DIAGNOSIS — M85.80 OSTEOPENIA, UNSPECIFIED LOCATION: ICD-10-CM

## 2025-08-06 PROCEDURE — 77080 DXA BONE DENSITY AXIAL: CPT

## 2025-08-12 ENCOUNTER — TELEPHONE (OUTPATIENT)
Age: 70
End: 2025-08-12